# Patient Record
Sex: MALE | Race: OTHER | NOT HISPANIC OR LATINO | URBAN - METROPOLITAN AREA
[De-identification: names, ages, dates, MRNs, and addresses within clinical notes are randomized per-mention and may not be internally consistent; named-entity substitution may affect disease eponyms.]

---

## 2022-08-04 ENCOUNTER — INPATIENT (INPATIENT)
Facility: HOSPITAL | Age: 42
LOS: 10 days | Discharge: HOME HEALTH | DRG: 329 | End: 2022-08-15
Attending: SURGERY | Admitting: SURGERY
Payer: MEDICAID

## 2022-08-04 VITALS
OXYGEN SATURATION: 98 % | TEMPERATURE: 98 F | RESPIRATION RATE: 16 BRPM | SYSTOLIC BLOOD PRESSURE: 102 MMHG | WEIGHT: 195.11 LBS | HEART RATE: 93 BPM | DIASTOLIC BLOOD PRESSURE: 65 MMHG

## 2022-08-04 DIAGNOSIS — Z98.890 OTHER SPECIFIED POSTPROCEDURAL STATES: Chronic | ICD-10-CM

## 2022-08-04 LAB
ANION GAP SERPL CALC-SCNC: 11 MMOL/L — SIGNIFICANT CHANGE UP (ref 5–17)
APPEARANCE UR: CLEAR — SIGNIFICANT CHANGE UP
APTT BLD: 28.6 SEC — SIGNIFICANT CHANGE UP (ref 27.5–35.5)
BILIRUB UR-MCNC: NEGATIVE — SIGNIFICANT CHANGE UP
BLD GP AB SCN SERPL QL: NEGATIVE — SIGNIFICANT CHANGE UP
BLD GP AB SCN SERPL QL: NEGATIVE — SIGNIFICANT CHANGE UP
BUN SERPL-MCNC: 11 MG/DL — SIGNIFICANT CHANGE UP (ref 7–23)
CALCIUM SERPL-MCNC: 9.1 MG/DL — SIGNIFICANT CHANGE UP (ref 8.4–10.5)
CHLORIDE SERPL-SCNC: 103 MMOL/L — SIGNIFICANT CHANGE UP (ref 96–108)
CO2 SERPL-SCNC: 24 MMOL/L — SIGNIFICANT CHANGE UP (ref 22–31)
COLOR SPEC: YELLOW — SIGNIFICANT CHANGE UP
CREAT SERPL-MCNC: 0.73 MG/DL — SIGNIFICANT CHANGE UP (ref 0.5–1.3)
DIFF PNL FLD: NEGATIVE — SIGNIFICANT CHANGE UP
EGFR: 116 ML/MIN/1.73M2 — SIGNIFICANT CHANGE UP
GLUCOSE SERPL-MCNC: 125 MG/DL — HIGH (ref 70–99)
GLUCOSE UR QL: NEGATIVE — SIGNIFICANT CHANGE UP
HCT VFR BLD CALC: 41.9 % — SIGNIFICANT CHANGE UP (ref 39–50)
HGB BLD-MCNC: 14.7 G/DL — SIGNIFICANT CHANGE UP (ref 13–17)
INR BLD: 1.06 — SIGNIFICANT CHANGE UP (ref 0.88–1.16)
KETONES UR-MCNC: NEGATIVE — SIGNIFICANT CHANGE UP
LEUKOCYTE ESTERASE UR-ACNC: NEGATIVE — SIGNIFICANT CHANGE UP
MCHC RBC-ENTMCNC: 31.7 PG — SIGNIFICANT CHANGE UP (ref 27–34)
MCHC RBC-ENTMCNC: 35.1 GM/DL — SIGNIFICANT CHANGE UP (ref 32–36)
MCV RBC AUTO: 90.3 FL — SIGNIFICANT CHANGE UP (ref 80–100)
NITRITE UR-MCNC: NEGATIVE — SIGNIFICANT CHANGE UP
NRBC # BLD: 0 /100 WBCS — SIGNIFICANT CHANGE UP (ref 0–0)
PH UR: 6 — SIGNIFICANT CHANGE UP (ref 5–8)
PLATELET # BLD AUTO: 257 K/UL — SIGNIFICANT CHANGE UP (ref 150–400)
POTASSIUM SERPL-MCNC: 3.9 MMOL/L — SIGNIFICANT CHANGE UP (ref 3.5–5.3)
POTASSIUM SERPL-SCNC: 3.9 MMOL/L — SIGNIFICANT CHANGE UP (ref 3.5–5.3)
PROT UR-MCNC: NEGATIVE MG/DL — SIGNIFICANT CHANGE UP
PROTHROM AB SERPL-ACNC: 12.6 SEC — SIGNIFICANT CHANGE UP (ref 10.5–13.4)
RBC # BLD: 4.64 M/UL — SIGNIFICANT CHANGE UP (ref 4.2–5.8)
RBC # FLD: 12.1 % — SIGNIFICANT CHANGE UP (ref 10.3–14.5)
RH IG SCN BLD-IMP: POSITIVE — SIGNIFICANT CHANGE UP
RH IG SCN BLD-IMP: POSITIVE — SIGNIFICANT CHANGE UP
SARS-COV-2 RNA SPEC QL NAA+PROBE: NEGATIVE — SIGNIFICANT CHANGE UP
SODIUM SERPL-SCNC: 138 MMOL/L — SIGNIFICANT CHANGE UP (ref 135–145)
SP GR SPEC: 1.01 — SIGNIFICANT CHANGE UP (ref 1–1.03)
UROBILINOGEN FLD QL: 0.2 E.U./DL — SIGNIFICANT CHANGE UP
WBC # BLD: 10.28 K/UL — SIGNIFICANT CHANGE UP (ref 3.8–10.5)
WBC # FLD AUTO: 10.28 K/UL — SIGNIFICANT CHANGE UP (ref 3.8–10.5)

## 2022-08-04 PROCEDURE — 88307 TISSUE EXAM BY PATHOLOGIST: CPT | Mod: 26

## 2022-08-04 PROCEDURE — 74177 CT ABD & PELVIS W/CONTRAST: CPT | Mod: 26,MA

## 2022-08-04 PROCEDURE — 99285 EMERGENCY DEPT VISIT HI MDM: CPT

## 2022-08-04 DEVICE — STAPLER ETHICON PROXIMATE 75MM WITH BLUE RELOAD: Type: IMPLANTABLE DEVICE | Status: FUNCTIONAL

## 2022-08-04 DEVICE — STAPLER ETHICON PROXIMATE RELOAD 75MM BLUE: Type: IMPLANTABLE DEVICE | Status: FUNCTIONAL

## 2022-08-04 RX ORDER — PIPERACILLIN AND TAZOBACTAM 4; .5 G/20ML; G/20ML
3.38 INJECTION, POWDER, LYOPHILIZED, FOR SOLUTION INTRAVENOUS ONCE
Refills: 0 | Status: COMPLETED | OUTPATIENT
Start: 2022-08-04 | End: 2022-08-04

## 2022-08-04 RX ORDER — ACETAMINOPHEN 500 MG
650 TABLET ORAL EVERY 6 HOURS
Refills: 0 | Status: DISCONTINUED | OUTPATIENT
Start: 2022-08-04 | End: 2022-08-05

## 2022-08-04 RX ORDER — SODIUM CHLORIDE 9 MG/ML
1000 INJECTION INTRAMUSCULAR; INTRAVENOUS; SUBCUTANEOUS ONCE
Refills: 0 | Status: COMPLETED | OUTPATIENT
Start: 2022-08-04 | End: 2022-08-04

## 2022-08-04 RX ORDER — METRONIDAZOLE 500 MG
500 TABLET ORAL EVERY 8 HOURS
Refills: 0 | Status: DISCONTINUED | OUTPATIENT
Start: 2022-08-04 | End: 2022-08-04

## 2022-08-04 RX ORDER — METRONIDAZOLE 500 MG
500 TABLET ORAL ONCE
Refills: 0 | Status: DISCONTINUED | OUTPATIENT
Start: 2022-08-04 | End: 2022-08-04

## 2022-08-04 RX ORDER — PIPERACILLIN AND TAZOBACTAM 4; .5 G/20ML; G/20ML
3.38 INJECTION, POWDER, LYOPHILIZED, FOR SOLUTION INTRAVENOUS EVERY 6 HOURS
Refills: 0 | Status: COMPLETED | OUTPATIENT
Start: 2022-08-05 | End: 2022-08-11

## 2022-08-04 RX ORDER — SODIUM CHLORIDE 9 MG/ML
1000 INJECTION, SOLUTION INTRAVENOUS
Refills: 0 | Status: DISCONTINUED | OUTPATIENT
Start: 2022-08-04 | End: 2022-08-08

## 2022-08-04 RX ORDER — HYDROMORPHONE HYDROCHLORIDE 2 MG/ML
1 INJECTION INTRAMUSCULAR; INTRAVENOUS; SUBCUTANEOUS ONCE
Refills: 0 | Status: DISCONTINUED | OUTPATIENT
Start: 2022-08-04 | End: 2022-08-05

## 2022-08-04 RX ORDER — ONDANSETRON 8 MG/1
4 TABLET, FILM COATED ORAL ONCE
Refills: 0 | Status: COMPLETED | OUTPATIENT
Start: 2022-08-04 | End: 2022-08-04

## 2022-08-04 RX ORDER — SODIUM CHLORIDE 9 MG/ML
500 INJECTION, SOLUTION INTRAVENOUS ONCE
Refills: 0 | Status: COMPLETED | OUTPATIENT
Start: 2022-08-04 | End: 2022-08-04

## 2022-08-04 RX ORDER — KETOROLAC TROMETHAMINE 30 MG/ML
15 SYRINGE (ML) INJECTION ONCE
Refills: 0 | Status: DISCONTINUED | OUTPATIENT
Start: 2022-08-04 | End: 2022-08-04

## 2022-08-04 RX ORDER — CEFTRIAXONE 500 MG/1
2000 INJECTION, POWDER, FOR SOLUTION INTRAMUSCULAR; INTRAVENOUS EVERY 24 HOURS
Refills: 0 | Status: DISCONTINUED | OUTPATIENT
Start: 2022-08-04 | End: 2022-08-04

## 2022-08-04 RX ORDER — HYDROMORPHONE HYDROCHLORIDE 2 MG/ML
0.5 INJECTION INTRAMUSCULAR; INTRAVENOUS; SUBCUTANEOUS EVERY 4 HOURS
Refills: 0 | Status: DISCONTINUED | OUTPATIENT
Start: 2022-08-04 | End: 2022-08-07

## 2022-08-04 RX ORDER — HEPARIN SODIUM 5000 [USP'U]/ML
5000 INJECTION INTRAVENOUS; SUBCUTANEOUS EVERY 8 HOURS
Refills: 0 | Status: DISCONTINUED | OUTPATIENT
Start: 2022-08-04 | End: 2022-08-05

## 2022-08-04 RX ORDER — CEFAZOLIN SODIUM 1 G
2000 VIAL (EA) INJECTION EVERY 8 HOURS
Refills: 0 | Status: DISCONTINUED | OUTPATIENT
Start: 2022-08-04 | End: 2022-08-04

## 2022-08-04 RX ORDER — CEFTRIAXONE 500 MG/1
1000 INJECTION, POWDER, FOR SOLUTION INTRAMUSCULAR; INTRAVENOUS ONCE
Refills: 0 | Status: DISCONTINUED | OUTPATIENT
Start: 2022-08-04 | End: 2022-08-04

## 2022-08-04 RX ORDER — ACETAMINOPHEN 500 MG
1000 TABLET ORAL ONCE
Refills: 0 | Status: COMPLETED | OUTPATIENT
Start: 2022-08-04 | End: 2022-08-04

## 2022-08-04 RX ORDER — DIATRIZOATE MEGLUMINE 180 MG/ML
30 INJECTION, SOLUTION INTRAVESICAL ONCE
Refills: 0 | Status: COMPLETED | OUTPATIENT
Start: 2022-08-04 | End: 2022-08-04

## 2022-08-04 RX ORDER — HYDROMORPHONE HYDROCHLORIDE 2 MG/ML
1 INJECTION INTRAMUSCULAR; INTRAVENOUS; SUBCUTANEOUS ONCE
Refills: 0 | Status: DISCONTINUED | OUTPATIENT
Start: 2022-08-04 | End: 2022-08-04

## 2022-08-04 RX ADMIN — HEPARIN SODIUM 5000 UNIT(S): 5000 INJECTION INTRAVENOUS; SUBCUTANEOUS at 21:40

## 2022-08-04 RX ADMIN — SODIUM CHLORIDE 1000 MILLILITER(S): 9 INJECTION, SOLUTION INTRAVENOUS at 19:43

## 2022-08-04 RX ADMIN — Medication 15 MILLIGRAM(S): at 15:16

## 2022-08-04 RX ADMIN — Medication 100 MILLIGRAM(S): at 18:37

## 2022-08-04 RX ADMIN — HYDROMORPHONE HYDROCHLORIDE 0.5 MILLIGRAM(S): 2 INJECTION INTRAMUSCULAR; INTRAVENOUS; SUBCUTANEOUS at 21:40

## 2022-08-04 RX ADMIN — Medication 15 MILLIGRAM(S): at 08:53

## 2022-08-04 RX ADMIN — ONDANSETRON 4 MILLIGRAM(S): 8 TABLET, FILM COATED ORAL at 19:13

## 2022-08-04 RX ADMIN — SODIUM CHLORIDE 1000 MILLILITER(S): 9 INJECTION INTRAMUSCULAR; INTRAVENOUS; SUBCUTANEOUS at 07:59

## 2022-08-04 RX ADMIN — HYDROMORPHONE HYDROCHLORIDE 1 MILLIGRAM(S): 2 INJECTION INTRAMUSCULAR; INTRAVENOUS; SUBCUTANEOUS at 19:06

## 2022-08-04 RX ADMIN — Medication 15 MILLIGRAM(S): at 15:42

## 2022-08-04 RX ADMIN — HEPARIN SODIUM 5000 UNIT(S): 5000 INJECTION INTRAVENOUS; SUBCUTANEOUS at 11:40

## 2022-08-04 RX ADMIN — CEFTRIAXONE 100 MILLIGRAM(S): 500 INJECTION, POWDER, FOR SOLUTION INTRAMUSCULAR; INTRAVENOUS at 12:30

## 2022-08-04 RX ADMIN — Medication 15 MILLIGRAM(S): at 08:26

## 2022-08-04 RX ADMIN — SODIUM CHLORIDE 130 MILLILITER(S): 9 INJECTION, SOLUTION INTRAVENOUS at 22:12

## 2022-08-04 RX ADMIN — Medication 100 MILLIGRAM(S): at 11:40

## 2022-08-04 RX ADMIN — HYDROMORPHONE HYDROCHLORIDE 0.5 MILLIGRAM(S): 2 INJECTION INTRAMUSCULAR; INTRAVENOUS; SUBCUTANEOUS at 22:15

## 2022-08-04 RX ADMIN — SODIUM CHLORIDE 1000 MILLILITER(S): 9 INJECTION INTRAMUSCULAR; INTRAVENOUS; SUBCUTANEOUS at 08:54

## 2022-08-04 RX ADMIN — Medication 100 MILLIGRAM(S): at 12:16

## 2022-08-04 RX ADMIN — DIATRIZOATE MEGLUMINE 30 MILLILITER(S): 180 INJECTION, SOLUTION INTRAVESICAL at 08:27

## 2022-08-04 RX ADMIN — PIPERACILLIN AND TAZOBACTAM 200 GRAM(S): 4; .5 INJECTION, POWDER, LYOPHILIZED, FOR SOLUTION INTRAVENOUS at 19:48

## 2022-08-04 RX ADMIN — HYDROMORPHONE HYDROCHLORIDE 1 MILLIGRAM(S): 2 INJECTION INTRAMUSCULAR; INTRAVENOUS; SUBCUTANEOUS at 18:59

## 2022-08-04 NOTE — H&P ADULT - NSHPPHYSICALEXAM_GEN_ALL_CORE
Physical Exam:  General: NAD, resting comfortably in bed  Pulmonary: Nonlabored, no respiratory distress  Cardiovascular: regular rate and rhythm   Abdominal: soft, significant tenderness and focal peritonitis in LLQ, rebound tenderness in RLQ, otherwise soft abdomen  Extremities: WWP, normal strength, Right knee in immobilizer consistent with history  Neuro: A/O x 3, no focal deficits, normal motor/sensation  Pulses: palpable distal pulses

## 2022-08-04 NOTE — H&P ADULT - NSHPLABSRESULTS_GEN_ALL_CORE
14.7   10.28 )-----------( 257      ( 04 Aug 2022 07:49 )             41.9   08-04    138  |  103  |  11  ----------------------------<  125<H>  3.9   |  24  |  0.73    Ca    9.1      04 Aug 2022 07:49    < from: CT Abdomen and Pelvis w/ Oral Cont and w/ IV Cont (08.04.22 @ 09:55) >    ACC: [id number] EXAM:  CT ABDOMEN AND PELVIS OC IC                          PROCEDURE DATE:  08/04/2022          INTERPRETATION:  CLINICAL INFORMATION: Left lower quadrant pain and   tenderness to palpation. Diarrhea.    COMPARISON: None available.    CONTRAST/COMPLICATIONS:  IV Contrast: Isovue 370  90 cc administered  Oral Contrast: Positive oral contrast  Complications: None reported at time of study completion    PROCEDURE:  CT of the Abdomen and Pelvis was performed.  Sagittal and coronal reformats were performed.    FINDINGS:  LOWER CHEST: Minimal dependent atelectasis.    LIVER: Within normal limits.  BILE DUCTS: Normal caliber.  GALLBLADDER: Within normal limits.  SPLEEN: Mildly enlarged measuring up to 14 cm. Multiple hypodense   lesions, largest is heterogenous attenuation 5.0 x 4.7 cm (3:49)   additional lesions are smaller, mostly subcentimeter.  PANCREAS: Within normal limits.  ADRENALS: Within normal limits.  KIDNEYS/URETERS: Within normal limits.    BLADDER: Within normal limits.  REPRODUCTIVE ORGANS: Prostate within normal limits.    BOWEL: Small hiatal hernia. Acute diverticulitis distal left colon and   proximal sigmoid, wall thickening and extensive local regional   pericolonic fat infiltration confined to left lower quadrant. Small   extraluminal gas in left lower quadrant (3:121) and adjacent mild   peritoneal thickening and enhancement. No adjacent organized fluid   collection/abscess. No bowel obstruction. Appendix is normal.  PERITONEUM: Small ascites in christy hepatis and left paracolic gutter and   pelvis.  VESSELS: Within normal limits.  RETROPERITONEUM/LYMPH NODES: No lymphadenopathy.  ABDOMINAL WALL: Within normal limits.  BONES: Within normal limits.    IMPRESSION:  1.  Acute diverticulitis distal left colon/proximal sigmoid with   microperforation and focal peritonitis in left lower quadrant.  2.  Mildly enlarged spleen with 5 cm splenic mass and few smaller   hypodense splenic lesions, indeterminate, possibly benign such as   hemangioma, malignancy cannot be excluded. In the absence of prior   imaging to confirm long-term stability, recommend outpatient follow-up   with contrast-enhanced MRI abdomen for further characterization.        --- End of Report ---            RADHA MCNEILL MD; Attending Radiologist  This document has been electronically signed. Aug  4 2022 10:22AM    < end of copied text >

## 2022-08-04 NOTE — ED ADULT NURSE NOTE - NSIMPLEMENTINTERV_GEN_ALL_ED
Implemented All Universal Safety Interventions:  [place] to call system. Call bell, personal items and telephone within reach. Instruct patient to call for assistance. Room bathroom lighting operational. Non-slip footwear when patient is off stretcher. Physically safe environment: no spills, clutter or unnecessary equipment. Stretcher in lowest position, wheels locked, appropriate side rails in place.

## 2022-08-04 NOTE — ED PROVIDER NOTE - PROGRESS NOTE DETAILS
Labs wnl, ct + diverticulitis, microperf.  Abx ordered.  Surg eval pt after they were contacted independently by Dr Deluca and were updated w ct results.  Pt reports pain improved.  CT w abnl findings in spleen - discussed w pt and instructed him to fu w pmd about this for outpt non-emergent imaging; pt expressed understanding and agreement.  Plan for admit to surg but await surg recommendations/dispo. Per surg, tba Dr Deluca, tele.

## 2022-08-04 NOTE — ED ADULT NURSE NOTE - OBJECTIVE STATEMENT
pt a&ox4 c/o llq pain and diarrhea beginning ~0400 this morning. pt reports hx of diverticulosis, reports similar symptoms.

## 2022-08-04 NOTE — ED ADULT TRIAGE NOTE - GLASGOW COMA SCALE: BEST MOTOR RESPONSE, MLM
(M6) obeys commands Erythromycin Counseling:  I discussed with the patient the risks of erythromycin including but not limited to GI upset, allergic reaction, drug rash, diarrhea, increase in liver enzymes, and yeast infections.

## 2022-08-04 NOTE — ED PROVIDER NOTE - PHYSICAL EXAMINATION
VITAL SIGNS: I have reviewed nursing notes and confirm.  CONSTITUTIONAL: Well-developed; well-nourished; in no acute distress.   SKIN:  warm and dry, no acute rash.   HEAD:  normocephalic, atraumatic.  EYES: PERRL, EOM intact; conjunctiva and sclera clear.  ENT: No nasal discharge; airway clear.   NECK: Supple; non tender.  CARD: S1, S2 normal; no murmurs, gallops, or rubs. Regular rate and rhythm.   RESP:  Clear to auscultation b/l, no wheezes, rales or rhonchi.  ABD: Normal bowel sounds; soft; non-distended; ttp LLQ w referred pain to LLQ, + voluntary guarding  MSK: Normal ROM. No clubbing, cyanosis or edema. no ttp bilat le  NEURO: Alert, oriented, grossly unremarkable  PSYCH: Cooperative, mood and affect appropriate.

## 2022-08-04 NOTE — ED ADULT TRIAGE NOTE - CHIEF COMPLAINT QUOTE
Patient presents to ER with chief complaints of LLQ abdominal pain and diarrhea since 4am. Patient states feels like diverticulosis flare up.

## 2022-08-04 NOTE — H&P ADULT - ASSESSMENT
43yo male with PMH of diverticulitis presents with 1 day history of abdominal pain and diarrhea. Afebrile, non tachycardic, normotensive, exam significant for significant tenderness and focal peritonitis. Labs notable for normal WBC normal renal function. CT demonstrating acute diverticulitis with microperforation with focal peritonitis. Incidental finding of splenic lesion. Impression is acute complicated diverticulitis with focal perforation.       - NPO/IVF  - Abx: CTX 2g, MTD  - Pain and Nausea control  - SCD/SQH  - IS/OOB  - Preop workup  - Serial abdominal exams  - AM labs

## 2022-08-04 NOTE — H&P ADULT - HISTORY OF PRESENT ILLNESS
41yo male with PMH of diverticulitis who presents to emergency with 1 day history of abdominal pain. Patient has had 3 previous episodes of uncomplicated diverticulitis managed outpatient with antibiotics. Presents today with acute onset intense, sharp, constant 10/10 LLQ abdominal pain, non radiating. Pain started at 4am this AM and awoke him from sleep. Pain feels more intense than previous episodes of diverticulitis. He endorses 1 day prodromal symptoms of diarrhea without blood or mucus and malaise. Denies current headache, fever, chills, nausea, vomiting, anorexia, weight loss or skin changes. Last colonoscopy revealing diverticulosis No personal or family history of IBD/CRC    PMH: Diverticulitis (3 episodes of uncomplicated)   PSH: Right knee MCL/ACL/Meniscus repair (2022)  MED: Propecia  ALL: NKDA  SH: non smoker, daily ETOH use, negative CAGE questionnaire   FH: no personal or family history of CRC/IBD  Functional capacity: >4 METS    In the ED:  - afebrile, non tachycardic, normotensive  - Labs wnl  - CTAP demonstrated:  1.  Acute diverticulitis distal left colon/proximal sigmoid with   microperforation and focal peritonitis in left lower quadrant.  2.  Mildly enlarged spleen with 5 cm splenic mass and few smaller   hypodense splenic lesions, indeterminate, possibly benign such as   hemangioma, malignancy cannot be excluded. In the absence of prior   imaging to confirm long-term stability, recommend outpatient follow-up   with contrast-enhanced MRI abdomen for further characterization.   43yo male with PMH of diverticulitis who presents to emergency with 1 day history of abdominal pain. Patient has had 3 previous episodes of uncomplicated diverticulitis managed outpatient with antibiotics. Presents today with acute onset intense, sharp, constant 10/10 LLQ abdominal pain, non radiating. Pain started at 4am this AM and awoke him from sleep. Pain feels more intense than previous episodes of diverticulitis. He endorses 1 day prodromal symptoms of diarrhea without blood or mucus and malaise. Denies current headache, fever, chills, nausea, vomiting, anorexia, weight loss or skin changes. No colonoscopy hx. No personal or family history of IBD/CRC    PMH: Diverticulitis (3 episodes of uncomplicated)   PSH: Right knee MCL/ACL/Meniscus repair (2022)  MED: Propecia  ALL: NKDA  SH: non smoker, daily ETOH use, negative CAGE questionnaire   FH: no personal or family history of CRC/IBD  Functional capacity: >4 METS    In the ED:  - afebrile, non tachycardic, normotensive  - Labs wnl  - CTAP demonstrated:  1.  Acute diverticulitis distal left colon/proximal sigmoid with   microperforation and focal peritonitis in left lower quadrant.  2.  Mildly enlarged spleen with 5 cm splenic mass and few smaller   hypodense splenic lesions, indeterminate, possibly benign such as   hemangioma, malignancy cannot be excluded. In the absence of prior   imaging to confirm long-term stability, recommend outpatient follow-up   with contrast-enhanced MRI abdomen for further characterization.

## 2022-08-04 NOTE — ED PROVIDER NOTE - CLINICAL SUMMARY MEDICAL DECISION MAKING FREE TEXT BOX
Pt c/o llq pain, diarrhea similar to prior diverticulitis w ttp LLQ w referred pain to LLQ v concerning for diverticulitis.  Less concern for stone.  Plan labs, ct abd/pelvis, pain meds prn.  Reassess.

## 2022-08-04 NOTE — ED PROVIDER NOTE - OBJECTIVE STATEMENT
41 yo male h/o diverticulitis c/o waking this am w severe 8/10 initially cramping now constant LLQ pain similar to prior diverticulitis associated w nonbloody diarrhea x 4.  No fever, n/v.  No dysuria, hematuria.  No meds pta for sx.  No radiation, no flank pain.  Pt prev saw a surgeon but delayed surgery for diverticulitis.

## 2022-08-05 LAB
ANION GAP SERPL CALC-SCNC: 10 MMOL/L — SIGNIFICANT CHANGE UP (ref 5–17)
ANISOCYTOSIS BLD QL: SLIGHT — SIGNIFICANT CHANGE UP
BASOPHILS # BLD AUTO: 0 K/UL — SIGNIFICANT CHANGE UP (ref 0–0.2)
BASOPHILS NFR BLD AUTO: 0 % — SIGNIFICANT CHANGE UP (ref 0–2)
BUN SERPL-MCNC: 8 MG/DL — SIGNIFICANT CHANGE UP (ref 7–23)
CALCIUM SERPL-MCNC: 8.9 MG/DL — SIGNIFICANT CHANGE UP (ref 8.4–10.5)
CHLORIDE SERPL-SCNC: 100 MMOL/L — SIGNIFICANT CHANGE UP (ref 96–108)
CO2 SERPL-SCNC: 27 MMOL/L — SIGNIFICANT CHANGE UP (ref 22–31)
CREAT SERPL-MCNC: 0.85 MG/DL — SIGNIFICANT CHANGE UP (ref 0.5–1.3)
EGFR: 111 ML/MIN/1.73M2 — SIGNIFICANT CHANGE UP
EOSINOPHIL # BLD AUTO: 0 K/UL — SIGNIFICANT CHANGE UP (ref 0–0.5)
EOSINOPHIL NFR BLD AUTO: 0 % — SIGNIFICANT CHANGE UP (ref 0–6)
GIANT PLATELETS BLD QL SMEAR: PRESENT — SIGNIFICANT CHANGE UP
GLUCOSE SERPL-MCNC: 137 MG/DL — HIGH (ref 70–99)
GRAM STN FLD: SIGNIFICANT CHANGE UP
HCT VFR BLD CALC: 38.8 % — LOW (ref 39–50)
HGB BLD-MCNC: 13.5 G/DL — SIGNIFICANT CHANGE UP (ref 13–17)
HYPOCHROMIA BLD QL: SLIGHT — SIGNIFICANT CHANGE UP
LYMPHOCYTES # BLD AUTO: 0.33 K/UL — LOW (ref 1–3.3)
LYMPHOCYTES # BLD AUTO: 3.5 % — LOW (ref 13–44)
MAGNESIUM SERPL-MCNC: 1.7 MG/DL — SIGNIFICANT CHANGE UP (ref 1.6–2.6)
MANUAL SMEAR VERIFICATION: SIGNIFICANT CHANGE UP
MCHC RBC-ENTMCNC: 31.5 PG — SIGNIFICANT CHANGE UP (ref 27–34)
MCHC RBC-ENTMCNC: 34.8 GM/DL — SIGNIFICANT CHANGE UP (ref 32–36)
MCV RBC AUTO: 90.4 FL — SIGNIFICANT CHANGE UP (ref 80–100)
METAMYELOCYTES # FLD: 0.9 % — HIGH (ref 0–0)
MICROCYTES BLD QL: SLIGHT — SIGNIFICANT CHANGE UP
MONOCYTES # BLD AUTO: 0.16 K/UL — SIGNIFICANT CHANGE UP (ref 0–0.9)
MONOCYTES NFR BLD AUTO: 1.7 % — LOW (ref 2–14)
NEUTROPHILS # BLD AUTO: 8.82 K/UL — HIGH (ref 1.8–7.4)
NEUTROPHILS NFR BLD AUTO: 93.9 % — HIGH (ref 43–77)
OVALOCYTES BLD QL SMEAR: SLIGHT — SIGNIFICANT CHANGE UP
PHOSPHATE SERPL-MCNC: 3.1 MG/DL — SIGNIFICANT CHANGE UP (ref 2.5–4.5)
PLAT MORPH BLD: ABNORMAL
PLATELET # BLD AUTO: 228 K/UL — SIGNIFICANT CHANGE UP (ref 150–400)
POIKILOCYTOSIS BLD QL AUTO: SLIGHT — SIGNIFICANT CHANGE UP
POTASSIUM SERPL-MCNC: 4 MMOL/L — SIGNIFICANT CHANGE UP (ref 3.5–5.3)
POTASSIUM SERPL-SCNC: 4 MMOL/L — SIGNIFICANT CHANGE UP (ref 3.5–5.3)
RBC # BLD: 4.29 M/UL — SIGNIFICANT CHANGE UP (ref 4.2–5.8)
RBC # FLD: 12.3 % — SIGNIFICANT CHANGE UP (ref 10.3–14.5)
RBC BLD AUTO: ABNORMAL
SODIUM SERPL-SCNC: 137 MMOL/L — SIGNIFICANT CHANGE UP (ref 135–145)
SPECIMEN SOURCE: SIGNIFICANT CHANGE UP
SPHEROCYTES BLD QL SMEAR: SLIGHT — SIGNIFICANT CHANGE UP
WBC # BLD: 9.39 K/UL — SIGNIFICANT CHANGE UP (ref 3.8–10.5)
WBC # FLD AUTO: 9.39 K/UL — SIGNIFICANT CHANGE UP (ref 3.8–10.5)

## 2022-08-05 PROCEDURE — 71045 X-RAY EXAM CHEST 1 VIEW: CPT | Mod: 26,76

## 2022-08-05 RX ORDER — ACETAMINOPHEN 500 MG
1000 TABLET ORAL EVERY 6 HOURS
Refills: 0 | Status: DISCONTINUED | OUTPATIENT
Start: 2022-08-05 | End: 2022-08-05

## 2022-08-05 RX ORDER — HYDROMORPHONE HYDROCHLORIDE 2 MG/ML
1 INJECTION INTRAMUSCULAR; INTRAVENOUS; SUBCUTANEOUS EVERY 4 HOURS
Refills: 0 | Status: DISCONTINUED | OUTPATIENT
Start: 2022-08-05 | End: 2022-08-07

## 2022-08-05 RX ORDER — HEPARIN SODIUM 5000 [USP'U]/ML
5000 INJECTION INTRAVENOUS; SUBCUTANEOUS EVERY 8 HOURS
Refills: 0 | Status: DISCONTINUED | OUTPATIENT
Start: 2022-08-05 | End: 2022-08-15

## 2022-08-05 RX ORDER — ONDANSETRON 8 MG/1
4 TABLET, FILM COATED ORAL EVERY 6 HOURS
Refills: 0 | Status: DISCONTINUED | OUTPATIENT
Start: 2022-08-05 | End: 2022-08-08

## 2022-08-05 RX ORDER — ACETAMINOPHEN 500 MG
1000 TABLET ORAL ONCE
Refills: 0 | Status: COMPLETED | OUTPATIENT
Start: 2022-08-05 | End: 2022-08-05

## 2022-08-05 RX ORDER — FINASTERIDE 5 MG/1
1 TABLET, FILM COATED ORAL DAILY
Refills: 0 | Status: DISCONTINUED | OUTPATIENT
Start: 2022-08-06 | End: 2022-08-06

## 2022-08-05 RX ORDER — LANOLIN ALCOHOL/MO/W.PET/CERES
5 CREAM (GRAM) TOPICAL AT BEDTIME
Refills: 0 | Status: DISCONTINUED | OUTPATIENT
Start: 2022-08-05 | End: 2022-08-15

## 2022-08-05 RX ORDER — ACETAMINOPHEN 500 MG
650 TABLET ORAL EVERY 6 HOURS
Refills: 0 | Status: DISCONTINUED | OUTPATIENT
Start: 2022-08-05 | End: 2022-08-15

## 2022-08-05 RX ORDER — MAGNESIUM SULFATE 500 MG/ML
1 VIAL (ML) INJECTION ONCE
Refills: 0 | Status: COMPLETED | OUTPATIENT
Start: 2022-08-05 | End: 2022-08-05

## 2022-08-05 RX ADMIN — HYDROMORPHONE HYDROCHLORIDE 0.5 MILLIGRAM(S): 2 INJECTION INTRAMUSCULAR; INTRAVENOUS; SUBCUTANEOUS at 03:59

## 2022-08-05 RX ADMIN — Medication 5 MILLIGRAM(S): at 22:17

## 2022-08-05 RX ADMIN — HYDROMORPHONE HYDROCHLORIDE 0.5 MILLIGRAM(S): 2 INJECTION INTRAMUSCULAR; INTRAVENOUS; SUBCUTANEOUS at 15:22

## 2022-08-05 RX ADMIN — PIPERACILLIN AND TAZOBACTAM 200 GRAM(S): 4; .5 INJECTION, POWDER, LYOPHILIZED, FOR SOLUTION INTRAVENOUS at 19:37

## 2022-08-05 RX ADMIN — PIPERACILLIN AND TAZOBACTAM 200 GRAM(S): 4; .5 INJECTION, POWDER, LYOPHILIZED, FOR SOLUTION INTRAVENOUS at 15:03

## 2022-08-05 RX ADMIN — PIPERACILLIN AND TAZOBACTAM 200 GRAM(S): 4; .5 INJECTION, POWDER, LYOPHILIZED, FOR SOLUTION INTRAVENOUS at 07:01

## 2022-08-05 RX ADMIN — Medication 1000 MILLIGRAM(S): at 19:42

## 2022-08-05 RX ADMIN — HEPARIN SODIUM 5000 UNIT(S): 5000 INJECTION INTRAVENOUS; SUBCUTANEOUS at 22:06

## 2022-08-05 RX ADMIN — Medication 100 GRAM(S): at 17:58

## 2022-08-05 RX ADMIN — PIPERACILLIN AND TAZOBACTAM 200 GRAM(S): 4; .5 INJECTION, POWDER, LYOPHILIZED, FOR SOLUTION INTRAVENOUS at 02:10

## 2022-08-05 RX ADMIN — Medication 400 MILLIGRAM(S): at 18:34

## 2022-08-05 RX ADMIN — HEPARIN SODIUM 5000 UNIT(S): 5000 INJECTION INTRAVENOUS; SUBCUTANEOUS at 15:03

## 2022-08-05 RX ADMIN — HYDROMORPHONE HYDROCHLORIDE 0.5 MILLIGRAM(S): 2 INJECTION INTRAMUSCULAR; INTRAVENOUS; SUBCUTANEOUS at 03:47

## 2022-08-05 RX ADMIN — HYDROMORPHONE HYDROCHLORIDE 0.5 MILLIGRAM(S): 2 INJECTION INTRAMUSCULAR; INTRAVENOUS; SUBCUTANEOUS at 15:02

## 2022-08-05 NOTE — PROGRESS NOTE ADULT - ASSESSMENT
42 M PMH of uncomplicated diverticulitis x3 (last episode 6 months ago) presents with acute complicated diverticulitis with focal perforation, on IV antibiotics now s/p Hartmans procedure on 8/4.      - sips/IVF   - NGT to LIWS  - Abx: zosyn; s/p CTX 2g, MTD  - Pain and Nausea control  - SCD/SQH in AM after cbc   - ostomy consult  - IS/OOB  - AM labs

## 2022-08-05 NOTE — CONSULT NOTE ADULT - SUBJECTIVE AND OBJECTIVE BOX
HPI:  43yo male with PMH of diverticulitis who presents to emergency with 1 day history of abdominal pain. Patient has had 3 previous episodes of uncomplicated diverticulitis managed outpatient with antibiotics. Presents today with acute onset intense, sharp, constant 10/10 LLQ abdominal pain, non radiating. Pain started at 4am this AM and awoke him from sleep. Pain feels more intense than previous episodes of diverticulitis. He endorses 1 day prodromal symptoms of diarrhea without blood or mucus and malaise. Denies current headache, fever, chills, nausea, vomiting, anorexia, weight loss or skin changes. No colonoscopy hx. No personal or family history of IBD/CRC    PMH: Diverticulitis (3 episodes of uncomplicated)   PSH: Right knee MCL/ACL/Meniscus repair ()  MED: Propecia  ALL: NKDA  SH: non smoker, daily ETOH use, negative CAGE questionnaire   FH: no personal or family history of CRC/IBD  Functional capacity: >4 METS    In the ED:  - afebrile, non tachycardic, normotensive  - Labs wnl  - CTAP demonstrated:  1.  Acute diverticulitis distal left colon/proximal sigmoid with   microperforation and focal peritonitis in left lower quadrant.  2.  Mildly enlarged spleen with 5 cm splenic mass and few smaller   hypodense splenic lesions, indeterminate, possibly benign such as   hemangioma, malignancy cannot be excluded. In the absence of prior   imaging to confirm long-term stability, recommend outpatient follow-up   with contrast-enhanced MRI abdomen for further characterization.   (04 Aug 2022 11:08)      PAST MEDICAL & SURGICAL HISTORY:  Diverticulitis      H/O knee surgery          REVIEW OF SYSTEMS      General:	    Skin/Breast:  	  Ophthalmologic:  	  ENMT:	    Respiratory and Thorax:  	  Cardiovascular:	    Gastrointestinal:	    Genitourinary:	    Musculoskeletal:	    Neurological:	    Psychiatric:	    Hematology/Lymphatics:	    Endocrine:	    Allergic/Immunologic:	    MEDICATIONS  (STANDING):  acetaminophen   IVPB .. 1000 milliGRAM(s) IV Intermittent once  lactated ringers. 1000 milliLiter(s) (130 mL/Hr) IV Continuous <Continuous>  piperacillin/tazobactam IVPB.. 3.375 Gram(s) IV Intermittent every 6 hours    MEDICATIONS  (PRN):  HYDROmorphone  Injectable 0.5 milliGRAM(s) IV Push every 4 hours PRN Severe Pain (7 - 10)  HYDROmorphone  Injectable 1 milliGRAM(s) IV Push once PRN Breakthrough pain  ondansetron Injectable 4 milliGRAM(s) IV Push every 6 hours PRN Nausea and/or Vomiting      Allergies    No Known Allergies    Intolerances        SOCIAL HISTORY:    FAMILY HISTORY:  No pertinent family history in first degree relatives        Vital Signs Last 24 Hrs  T(C): 36.9 (05 Aug 2022 04:42), Max: 37.8 (04 Aug 2022 20:20)  T(F): 98.4 (05 Aug 2022 04:42), Max: 100.1 (04 Aug 2022 20:20)  HR: 109 (05 Aug 2022 04:18) (84 - 121)  BP: 133/78 (05 Aug 2022 04:18) (119/66 - 164/85)  BP(mean): 118 (05 Aug 2022 03:59) (85 - 118)  RR: 16 (05 Aug 2022 04:18) (9 - 18)  SpO2: 93% (05 Aug 2022 04:18) (93% - 99%)    Parameters below as of 05 Aug 2022 04:18  Patient On (Oxygen Delivery Method): room air        PHYSICAL EXAM:      Constitutional:    Eyes:    ENMT:    Neck:    Breasts:    Back:    Respiratory:    Cardiovascular:    Gastrointestinal:    Genitourinary:    Rectal:    Extremities:    Vascular:    Neurological:    Skin:    Lymph Nodes:    Musculoskeletal:    Psychiatric:        LABS:                        14.7   10.28 )-----------( 257      ( 04 Aug 2022 07:49 )             41.9     08-04    138  |  103  |  11  ----------------------------<  125<H>  3.9   |  24  |  0.73    Ca    9.1      04 Aug 2022 07:49      PT/INR - ( 04 Aug 2022 11:28 )   PT: 12.6 sec;   INR: 1.06          PTT - ( 04 Aug 2022 11:28 )  PTT:28.6 sec  Urinalysis Basic - ( 04 Aug 2022 09:22 )    Color: Yellow / Appearance: Clear / S.015 / pH: x  Gluc: x / Ketone: NEGATIVE  / Bili: Negative / Urobili: 0.2 E.U./dL   Blood: x / Protein: NEGATIVE mg/dL / Nitrite: NEGATIVE   Leuk Esterase: NEGATIVE / RBC: x / WBC x   Sq Epi: x / Non Sq Epi: x / Bacteria: x        RADIOLOGY & ADDITIONAL STUDIES: HPI:  41yo male with PMH of diverticulitis who presents to emergency with 1 day history of abdominal pain. Patient has had 3 previous episodes of uncomplicated diverticulitis managed outpatient with antibiotics. Presents today with acute onset intense, sharp, constant 10/10 LLQ abdominal pain, non radiating. Pain started at 4am this AM and awoke him from sleep. Pain feels more intense than previous episodes of diverticulitis. He endorses 1 day prodromal symptoms of diarrhea without blood or mucus and malaise. Denies current headache, fever, chills, nausea, vomiting, anorexia, weight loss or skin changes. No colonoscopy hx. No personal or family history of IBD/CRC    Patient underwent left colon resection with colostomy earlier this morning.  Currently on Pip-Tazo  Uncomfortable with some abdominal pain  No N/V    ROS otherwise negative      PAST MEDICAL & SURGICAL HISTORY:  Diverticulitis      H/O knee surgery        MEDICATIONS  (STANDING):  acetaminophen   IVPB .. 1000 milliGRAM(s) IV Intermittent once  lactated ringers. 1000 milliLiter(s) (130 mL/Hr) IV Continuous <Continuous>  piperacillin/tazobactam IVPB.. 3.375 Gram(s) IV Intermittent every 6 hours    MEDICATIONS  (PRN):  HYDROmorphone  Injectable 0.5 milliGRAM(s) IV Push every 4 hours PRN Severe Pain (7 - 10)  HYDROmorphone  Injectable 1 milliGRAM(s) IV Push once PRN Breakthrough pain  ondansetron Injectable 4 milliGRAM(s) IV Push every 6 hours PRN Nausea and/or Vomiting      Allergies    No Known Allergies      SOCIAL HISTORY:  Works - owns pharmacy  No illicit drug use  No smoking  Uses EtOH daily  Originally from [place]       FAMILY HISTORY:  No pertinent family history in first degree relatives      EXAM  Vital Signs Last 24 Hrs  T(C): 36.9 (05 Aug 2022 04:42), Max: 37.8 (04 Aug 2022 20:20)  T(F): 98.4 (05 Aug 2022 04:42), Max: 100.1 (04 Aug 2022 20:20)  HR: 109 (05 Aug 2022 04:18) (84 - 121)  BP: 133/78 (05 Aug 2022 04:18) (119/66 - 164/85)  BP(mean): 118 (05 Aug 2022 03:59) (85 - 118)  RR: 16 (05 Aug 2022 04:18) (9 - 18)  SpO2: 93% (05 Aug 2022 04:18) (93% - 99%)    Parameters below as of 05 Aug 2022 04:18  Patient On (Oxygen Delivery Method): room air  Awake and alert  No distress  No jaundice or icterus  No rash  Oral mucosa clear  No cervical adenopathy  Low grade regular tachy  Chest CTA  Abd softly distended and tender appropriately after OR. Colostomy  LEs no edema      LABS:                        14.7   10.28 )-----------( 257      ( 04 Aug 2022 07:49 )             41.9         138  |  103  |  11  ----------------------------<  125<H>  3.9   |  24  |  0.73    Ca    9.1      04 Aug 2022 07:49      PT/INR - ( 04 Aug 2022 11:28 )   PT: 12.6 sec;   INR: 1.06          PTT - ( 04 Aug 2022 11:28 )  PTT:28.6 sec  Urinalysis Basic - ( 04 Aug 2022 09:22 )    Color: Yellow / Appearance: Clear / S.015 / pH: x  Gluc: x / Ketone: NEGATIVE  / Bili: Negative / Urobili: 0.2 E.U./dL   Blood: x / Protein: NEGATIVE mg/dL / Nitrite: NEGATIVE   Leuk Esterase: NEGATIVE / RBC: x / WBC x   Sq Epi: x / Non Sq Epi: x / Bacteria: x    Culture - Surgical Swab (22 @ 23:39)    Gram Stain:   Rare Gram positive cocci in pairs  Moderate to Numerous White blood cells    Specimen Source: .Surgical Swab Intra-abdominal puss    Culture - Blood (22 @ 12:33)    Specimen Source: .Blood Blood-Peripheral    Culture Results:   No growth at 1 day.    Culture - Blood (22 @ 10:59)    Specimen Source: .Blood Blood-Peripheral    Culture Results:   No growth at 1 day.        RADIOLOGY & ADDITIONAL STUDIES:    CT Abdomen and Pelvis w/ Oral Cont and w/ IV Cont (22 @ 09:55) >  ACC: [id number] EXAM:  CT ABDOMEN AND PELVIS OC IC                          PROCEDURE DATE:  2022          INTERPRETATION:  CLINICAL INFORMATION: Left lower quadrant pain and   tenderness to palpation. Diarrhea.    COMPARISON: None available.    CONTRAST/COMPLICATIONS:  IV Contrast: Isovue 370  90 cc administered  Oral Contrast: Positive oral contrast  Complications: None reported at time of study completion    PROCEDURE:  CT of the Abdomen and Pelvis was performed.  Sagittal and coronal reformats were performed.    FINDINGS:  LOWER CHEST: Minimal dependent atelectasis.    LIVER: Within normal limits.  BILE DUCTS: Normal caliber.  GALLBLADDER: Within normal limits.  SPLEEN: Mildly enlarged measuring up to 14 cm. Multiple hypodense   lesions, largest is heterogenous attenuation 5.0 x 4.7 cm (3:49)   additional lesions are smaller, mostly subcentimeter.  PANCREAS: Within normal limits.  ADRENALS: Within normal limits.  KIDNEYS/URETERS: Within normal limits.    BLADDER: Within normal limits.  REPRODUCTIVE ORGANS: Prostate within normal limits.    BOWEL: Small hiatal hernia. Acute diverticulitis distal left colon and   proximal sigmoid, wall thickening and extensive local regional   pericolonic fat infiltration confined to left lower quadrant. Small   extraluminal gas in left lower quadrant (3:121) and adjacent mild   peritoneal thickening and enhancement. No adjacent organized fluid   collection/abscess. No bowel obstruction. Appendix is normal.  PERITONEUM: Small ascites in christy hepatis and left paracolic gutter and   pelvis.  VESSELS: Within normal limits.  RETROPERITONEUM/LYMPH NODES: No lymphadenopathy.  ABDOMINAL WALL: Within normal limits.  BONES: Within normal limits.    IMPRESSION:  1.  Acute diverticulitis distal left colon/proximal sigmoid with   microperforation and focal peritonitis in left lower quadrant.  2.  Mildly enlarged spleen with 5 cm splenic mass and few smaller   hypodense splenic lesions, indeterminate, possibly benign such as   hemangioma, malignancy cannot be excluded. In the absence of prior   imaging to confirm long-term stability, recommend outpatient follow-up   with contrast-enhanced MRI abdomen for further characterization.

## 2022-08-05 NOTE — PROGRESS NOTE ADULT - SUBJECTIVE AND OBJECTIVE BOX
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 Patient has had 3 previous episodes of uncomplicated diverticulitis managed outpatient with antibiotics. Presents today with acute onset intense, sharp, constant 10/10 LLQ abdominal pain, non radiating. Sent to ED by Dr Poole has perforated diverticulitis admitted under Dr Deluca and had surgery last night      PMH: Diverticulitis (3 episodes of uncomplicated)   PSH: Right knee MCL/ACL/Meniscus repair (2022)  MED: Propecia  ALL: NKDA  SH: non smoker, daily ETOH use, negative CAGE questionnaire   FH: no personal or family history of CRC/IBD  Functional capacity: >4 METS    In the ED:  - afebrile, non tachycardic, normotensive  - Labs wnl  - CTAP demonstrated:  1.  Acute diverticulitis distal left colon/proximal sigmoid with   microperforation and focal peritonitis in left lower quadrant.  2.  Mildly enlarged spleen with 5 cm splenic mass and few smaller   hypodense splenic lesions, indeterminate, possibly benign such as   hemangioma, malignancy cannot be excluded. In the absence of prior   imaging to confirm long-term stability, recommend outpatient follow-up   with contrast-enhanced MRI abdomen for further characterization.   (04 Aug 2022 11:08)      REVIEW OF SYSTEMS:  Constitutional: No fever,  ENMT:  No difficulty hearing, tinnitus, vertigo; NGT  Respiratory: No cough, wheezing, chills or hemoptysis  Cardiovascular: No chest pain, palpitations, dizziness or leg swelling  Gastrointestinal: No abdominal or epigastric pain currently. No nausea, vomiting or hematemesis; No diarrhea or constipation. No melena or hematochezia.  Skin: No itching, burning, rashes or lesions   Musculoskeletal: No joint pain or swelling; No muscle, back or extremity pain    PAST MEDICAL & SURGICAL HISTORY:  Diverticulitis      H/O knee surgery          FAMILY HISTORY:  No pertinent family history in first degree relatives        SOCIAL HISTORY:  Smoking Status: [ ] Current, [ ] Former, [ ] Never  Pack Years:    MEDICATIONS:  MEDICATIONS  (STANDING):  acetaminophen   IVPB .. 1000 milliGRAM(s) IV Intermittent once  lactated ringers. 1000 milliLiter(s) (130 mL/Hr) IV Continuous <Continuous>  piperacillin/tazobactam IVPB.. 3.375 Gram(s) IV Intermittent every 6 hours    MEDICATIONS  (PRN):  HYDROmorphone  Injectable 0.5 milliGRAM(s) IV Push every 4 hours PRN Severe Pain (7 - 10)  HYDROmorphone  Injectable 1 milliGRAM(s) IV Push once PRN Breakthrough pain  ondansetron Injectable 4 milliGRAM(s) IV Push every 6 hours PRN Nausea and/or Vomiting      Allergies    No Known Allergies    Intolerances        Vital Signs Last 24 Hrs  T(C): 37.1 (05 Aug 2022 09:04), Max: 37.8 (04 Aug 2022 20:20)  T(F): 98.7 (05 Aug 2022 09:04), Max: 100.1 (04 Aug 2022 20:20)  HR: 80 (05 Aug 2022 08:16) (80 - 121)  BP: 128/70 (05 Aug 2022 08:16) (119/66 - 164/85)  BP(mean): 91 (05 Aug 2022 08:16) (85 - 118)  RR: 17 (05 Aug 2022 08:16) (9 - 18)  SpO2: 92% (05 Aug 2022 08:16) (92% - 99%)    Parameters below as of 05 Aug 2022 08:16  Patient On (Oxygen Delivery Method): room air        08-04 @ 07:01  -  08-05 @ 07:00  --------------------------------------------------------  IN: 3240 mL / OUT: 1400 mL / NET: 1840 mL          PHYSICAL EXAM:    General: Well developed; well nourished; in no acute distress  HEENT: MMM, conjunctiva and sclera clear  Lungs: clear  Heart: regular  Gastrointestinal: Soft, flat non-distended; +/-l bowel sounds; wound clean, ostomy pink LLQ  Extremities: Normal range of motion, No clubbing, cyanosis or edema  Neurological: Alert and oriented x3  Skin: Warm and dry. No obvious rash      LABS:                        14.7   10.28 )-----------( 257      ( 04 Aug 2022 07:49 )             41.9     08-04    138  |  103  |  11  ----------------------------<  125<H>  3.9   |  24  |  0.73    Ca    9.1      04 Aug 2022 07:49        Culture Results:   No growth at 12 hours (08-04 @ 12:33)  Culture Results:   No growth at 12 hours (08-04 @ 10:59)      RADIOLOGY & ADDITIONAL STUDIES:

## 2022-08-05 NOTE — PATIENT PROFILE ADULT - FALL HARM RISK - HARM RISK INTERVENTIONS

## 2022-08-05 NOTE — BRIEF OPERATIVE NOTE - OPERATION/FINDINGS
Midline laparotomy, 100cc of pus aspirate from deep pelvis  Sigmoid colon mobilised, diseased portion isolated with 2x JN stapler  Mesentery taken with ligasure.   Sepra film applied between retroperitoneum and colon and between colon and anterior abdominal wall  Fascia closed with looped PDS  End colostomy matured in LLQ.

## 2022-08-05 NOTE — CONSULT NOTE ADULT - ASSESSMENT
RECOMMEND  Continue Pip-Tazo Perforated diverticulitis with significant antibiotic exposure due to prior episodes of diverticulitis   S.P Sabas's procedure  Incidentally found splenic mass of unclear origin - need follow up / further investigation  Low grade fever      RECOMMEND  Continue Pip-Tazo while awaiting OR cultures        [phone]

## 2022-08-05 NOTE — PROGRESS NOTE ADULT - SUBJECTIVE AND OBJECTIVE BOX
STATUS POST:  Hartmans procedure     POST OPERATIVE DAY #: 1    SUBJECTIVE: Pt seen and examined at bedside this am by surgery team. Feels much improved from yesterday. Pain well controlled. NG Tube with minimal output overnight. voiding well. Denies f/n/v/cp/sob.    Vital Signs Last 24 Hrs  T(C): 37.1 (05 Aug 2022 09:04), Max: 37.8 (04 Aug 2022 20:20)  T(F): 98.7 (05 Aug 2022 09:04), Max: 100.1 (04 Aug 2022 20:20)  HR: 80 (05 Aug 2022 08:16) (80 - 121)  BP: 128/70 (05 Aug 2022 08:16) (119/66 - 164/85)  BP(mean): 91 (05 Aug 2022 08:16) (85 - 118)  RR: 17 (05 Aug 2022 08:16) (9 - 18)  SpO2: 92% (05 Aug 2022 08:16) (92% - 99%)    Parameters below as of 05 Aug 2022 08:16  Patient On (Oxygen Delivery Method): room air        PHYSICAL EXAM:  Constitutional: awake, alert, NAD, resting comfortably   Respiratory: non labored breathing, no respiratory distress  Cardiovascular: NSR, RRR  Gastrointestinal: soft, appropriately tender, non distended, no rebound, no guarding, midline dressing c/d/i, ostomy to L of midline   Genitourinary: blakely   Extremities: (-) edema, wwp, SCDs in place           I&O's Detail    04 Aug 2022 07:01  -  05 Aug 2022 07:00  --------------------------------------------------------  IN:    IV PiggyBack: 200 mL    Lactated Ringers: 3040 mL  Total IN: 3240 mL    OUT:    Blood Loss (mL): 100 mL    Indwelling Catheter - Urethral (mL): 300 mL    Nasogastric/Oral tube (mL): 0 mL    Voided (mL): 1000 mL  Total OUT: 1400 mL    Total NET: 1840 mL          LABS:                        14.7   10.28 )-----------( 257      ( 04 Aug 2022 07:49 )             41.9     08-04    138  |  103  |  11  ----------------------------<  125<H>  3.9   |  24  |  0.73    Ca    9.1      04 Aug 2022 07:49      PT/INR - ( 04 Aug 2022 11:28 )   PT: 12.6 sec;   INR: 1.06          PTT - ( 04 Aug 2022 11:28 )  PTT:28.6 sec  Urinalysis Basic - ( 04 Aug 2022 09:22 )    Color: Yellow / Appearance: Clear / S.015 / pH: x  Gluc: x / Ketone: NEGATIVE  / Bili: Negative / Urobili: 0.2 E.U./dL   Blood: x / Protein: NEGATIVE mg/dL / Nitrite: NEGATIVE   Leuk Esterase: NEGATIVE / RBC: x / WBC x   Sq Epi: x / Non Sq Epi: x / Bacteria: x        RADIOLOGY & ADDITIONAL STUDIES:

## 2022-08-05 NOTE — PROGRESS NOTE ADULT - ASSESSMENT
perforated diverticulitis with peritonitis s/o colostomy  antibiotics per ID  NGT, IVF , NPO  appreciate surgical team

## 2022-08-06 LAB
ANION GAP SERPL CALC-SCNC: 8 MMOL/L — SIGNIFICANT CHANGE UP (ref 5–17)
BUN SERPL-MCNC: 9 MG/DL — SIGNIFICANT CHANGE UP (ref 7–23)
CALCIUM SERPL-MCNC: 8.5 MG/DL — SIGNIFICANT CHANGE UP (ref 8.4–10.5)
CHLORIDE SERPL-SCNC: 101 MMOL/L — SIGNIFICANT CHANGE UP (ref 96–108)
CO2 SERPL-SCNC: 27 MMOL/L — SIGNIFICANT CHANGE UP (ref 22–31)
CREAT SERPL-MCNC: 0.88 MG/DL — SIGNIFICANT CHANGE UP (ref 0.5–1.3)
EGFR: 110 ML/MIN/1.73M2 — SIGNIFICANT CHANGE UP
GLUCOSE SERPL-MCNC: 113 MG/DL — HIGH (ref 70–99)
HCT VFR BLD CALC: 32.1 % — LOW (ref 39–50)
HCT VFR BLD CALC: 35.2 % — LOW (ref 39–50)
HGB BLD-MCNC: 10.8 G/DL — LOW (ref 13–17)
HGB BLD-MCNC: 12 G/DL — LOW (ref 13–17)
MAGNESIUM SERPL-MCNC: 2 MG/DL — SIGNIFICANT CHANGE UP (ref 1.6–2.6)
MCHC RBC-ENTMCNC: 31.2 PG — SIGNIFICANT CHANGE UP (ref 27–34)
MCHC RBC-ENTMCNC: 31.2 PG — SIGNIFICANT CHANGE UP (ref 27–34)
MCHC RBC-ENTMCNC: 33.6 GM/DL — SIGNIFICANT CHANGE UP (ref 32–36)
MCHC RBC-ENTMCNC: 34.1 GM/DL — SIGNIFICANT CHANGE UP (ref 32–36)
MCV RBC AUTO: 91.4 FL — SIGNIFICANT CHANGE UP (ref 80–100)
MCV RBC AUTO: 92.8 FL — SIGNIFICANT CHANGE UP (ref 80–100)
NRBC # BLD: 0 /100 WBCS — SIGNIFICANT CHANGE UP (ref 0–0)
NRBC # BLD: 0 /100 WBCS — SIGNIFICANT CHANGE UP (ref 0–0)
PHOSPHATE SERPL-MCNC: 2.5 MG/DL — SIGNIFICANT CHANGE UP (ref 2.5–4.5)
PLATELET # BLD AUTO: 213 K/UL — SIGNIFICANT CHANGE UP (ref 150–400)
PLATELET # BLD AUTO: 215 K/UL — SIGNIFICANT CHANGE UP (ref 150–400)
POTASSIUM SERPL-MCNC: 3.7 MMOL/L — SIGNIFICANT CHANGE UP (ref 3.5–5.3)
POTASSIUM SERPL-SCNC: 3.7 MMOL/L — SIGNIFICANT CHANGE UP (ref 3.5–5.3)
RBC # BLD: 3.46 M/UL — LOW (ref 4.2–5.8)
RBC # BLD: 3.85 M/UL — LOW (ref 4.2–5.8)
RBC # FLD: 12.1 % — SIGNIFICANT CHANGE UP (ref 10.3–14.5)
RBC # FLD: 12.3 % — SIGNIFICANT CHANGE UP (ref 10.3–14.5)
SODIUM SERPL-SCNC: 136 MMOL/L — SIGNIFICANT CHANGE UP (ref 135–145)
WBC # BLD: 6.57 K/UL — SIGNIFICANT CHANGE UP (ref 3.8–10.5)
WBC # BLD: 7.08 K/UL — SIGNIFICANT CHANGE UP (ref 3.8–10.5)
WBC # FLD AUTO: 6.57 K/UL — SIGNIFICANT CHANGE UP (ref 3.8–10.5)
WBC # FLD AUTO: 7.08 K/UL — SIGNIFICANT CHANGE UP (ref 3.8–10.5)

## 2022-08-06 RX ORDER — KETOROLAC TROMETHAMINE 30 MG/ML
15 SYRINGE (ML) INJECTION EVERY 6 HOURS
Refills: 0 | Status: DISCONTINUED | OUTPATIENT
Start: 2022-08-06 | End: 2022-08-09

## 2022-08-06 RX ORDER — POTASSIUM CHLORIDE 20 MEQ
10 PACKET (EA) ORAL
Refills: 0 | Status: COMPLETED | OUTPATIENT
Start: 2022-08-06 | End: 2022-08-06

## 2022-08-06 RX ORDER — ZOLPIDEM TARTRATE 10 MG/1
5 TABLET ORAL AT BEDTIME
Refills: 0 | Status: DISCONTINUED | OUTPATIENT
Start: 2022-08-06 | End: 2022-08-08

## 2022-08-06 RX ORDER — FINASTERIDE 5 MG/1
1 TABLET, FILM COATED ORAL EVERY 24 HOURS
Refills: 0 | Status: DISCONTINUED | OUTPATIENT
Start: 2022-08-06 | End: 2022-08-07

## 2022-08-06 RX ORDER — ZOLPIDEM TARTRATE 10 MG/1
5 TABLET ORAL AT BEDTIME
Refills: 0 | Status: DISCONTINUED | OUTPATIENT
Start: 2022-08-06 | End: 2022-08-07

## 2022-08-06 RX ADMIN — Medication 15 MILLIGRAM(S): at 23:04

## 2022-08-06 RX ADMIN — PIPERACILLIN AND TAZOBACTAM 200 GRAM(S): 4; .5 INJECTION, POWDER, LYOPHILIZED, FOR SOLUTION INTRAVENOUS at 06:19

## 2022-08-06 RX ADMIN — PIPERACILLIN AND TAZOBACTAM 200 GRAM(S): 4; .5 INJECTION, POWDER, LYOPHILIZED, FOR SOLUTION INTRAVENOUS at 01:32

## 2022-08-06 RX ADMIN — PIPERACILLIN AND TAZOBACTAM 200 GRAM(S): 4; .5 INJECTION, POWDER, LYOPHILIZED, FOR SOLUTION INTRAVENOUS at 19:00

## 2022-08-06 RX ADMIN — HEPARIN SODIUM 5000 UNIT(S): 5000 INJECTION INTRAVENOUS; SUBCUTANEOUS at 06:18

## 2022-08-06 RX ADMIN — PIPERACILLIN AND TAZOBACTAM 200 GRAM(S): 4; .5 INJECTION, POWDER, LYOPHILIZED, FOR SOLUTION INTRAVENOUS at 13:49

## 2022-08-06 RX ADMIN — Medication 15 MILLIGRAM(S): at 17:50

## 2022-08-06 RX ADMIN — Medication 15 MILLIGRAM(S): at 14:25

## 2022-08-06 RX ADMIN — HYDROMORPHONE HYDROCHLORIDE 0.5 MILLIGRAM(S): 2 INJECTION INTRAMUSCULAR; INTRAVENOUS; SUBCUTANEOUS at 21:36

## 2022-08-06 RX ADMIN — HYDROMORPHONE HYDROCHLORIDE 1 MILLIGRAM(S): 2 INJECTION INTRAMUSCULAR; INTRAVENOUS; SUBCUTANEOUS at 13:41

## 2022-08-06 RX ADMIN — Medication 100 MILLIEQUIVALENT(S): at 17:53

## 2022-08-06 RX ADMIN — Medication 100 MILLIEQUIVALENT(S): at 13:50

## 2022-08-06 RX ADMIN — HYDROMORPHONE HYDROCHLORIDE 1 MILLIGRAM(S): 2 INJECTION INTRAMUSCULAR; INTRAVENOUS; SUBCUTANEOUS at 03:01

## 2022-08-06 RX ADMIN — SODIUM CHLORIDE 130 MILLILITER(S): 9 INJECTION, SOLUTION INTRAVENOUS at 21:38

## 2022-08-06 RX ADMIN — HYDROMORPHONE HYDROCHLORIDE 0.5 MILLIGRAM(S): 2 INJECTION INTRAMUSCULAR; INTRAVENOUS; SUBCUTANEOUS at 22:29

## 2022-08-06 RX ADMIN — Medication 15 MILLIGRAM(S): at 13:50

## 2022-08-06 RX ADMIN — HYDROMORPHONE HYDROCHLORIDE 1 MILLIGRAM(S): 2 INJECTION INTRAMUSCULAR; INTRAVENOUS; SUBCUTANEOUS at 09:14

## 2022-08-06 RX ADMIN — HEPARIN SODIUM 5000 UNIT(S): 5000 INJECTION INTRAVENOUS; SUBCUTANEOUS at 21:06

## 2022-08-06 RX ADMIN — Medication 650 MILLIGRAM(S): at 21:35

## 2022-08-06 RX ADMIN — HYDROMORPHONE HYDROCHLORIDE 1 MILLIGRAM(S): 2 INJECTION INTRAMUSCULAR; INTRAVENOUS; SUBCUTANEOUS at 03:33

## 2022-08-06 RX ADMIN — Medication 650 MILLIGRAM(S): at 21:06

## 2022-08-06 RX ADMIN — Medication 100 MILLIEQUIVALENT(S): at 15:37

## 2022-08-06 RX ADMIN — HEPARIN SODIUM 5000 UNIT(S): 5000 INJECTION INTRAVENOUS; SUBCUTANEOUS at 13:50

## 2022-08-06 RX ADMIN — Medication 15 MILLIGRAM(S): at 18:05

## 2022-08-06 RX ADMIN — SODIUM CHLORIDE 130 MILLILITER(S): 9 INJECTION, SOLUTION INTRAVENOUS at 15:36

## 2022-08-06 NOTE — PROGRESS NOTE ADULT - SUBJECTIVE AND OBJECTIVE BOX
INTERVAL HPI/OVERNIGHT EVENTS:    ANTIBIOTICS/RELEVANT:    MEDICATIONS  (STANDING):  finasteride 1 milliGRAM(s) Oral every 24 hours  heparin   Injectable 5000 Unit(s) SubCutaneous every 8 hours  ketorolac   Injectable 15 milliGRAM(s) IV Push every 6 hours  lactated ringers. 1000 milliLiter(s) (130 mL/Hr) IV Continuous <Continuous>  piperacillin/tazobactam IVPB.. 3.375 Gram(s) IV Intermittent every 6 hours  potassium chloride  10 mEq/100 mL IVPB 10 milliEquivalent(s) IV Intermittent every 1 hour    MEDICATIONS  (PRN):  acetaminophen     Tablet .. 650 milliGRAM(s) Oral every 6 hours PRN mild pain (1-3)  HYDROmorphone  Injectable 0.5 milliGRAM(s) IV Push every 4 hours PRN Moderate Pain (4 - 6)  HYDROmorphone  Injectable 1 milliGRAM(s) IV Push every 4 hours PRN Severe Pain (7 - 10)  melatonin 5 milliGRAM(s) Oral at bedtime PRN Sleep  ondansetron Injectable 4 milliGRAM(s) IV Push every 6 hours PRN Nausea and/or Vomiting      Allergies    No Known Allergies    Intolerances        Vital Signs Last 24 Hrs  T(C): 37.1 (06 Aug 2022 15:48), Max: 37.1 (05 Aug 2022 18:06)  T(F): 98.7 (06 Aug 2022 15:48), Max: 98.8 (06 Aug 2022 14:00)  HR: 83 (06 Aug 2022 15:48) (64 - 98)  BP: 132/84 (06 Aug 2022 15:48) (116/61 - 134/87)  BP(mean): 92 (06 Aug 2022 08:21) (82 - 106)  RR: 14 (06 Aug 2022 15:48) (14 - 18)  SpO2: 96% (06 Aug 2022 15:48) (91% - 96%)    Parameters below as of 06 Aug 2022 15:48  Patient On (Oxygen Delivery Method): room air              LABS:                        10.8   6.57  )-----------( 213      ( 06 Aug 2022 05:30 )             32.1     08-06    136  |  101  |  9   ----------------------------<  113<H>  3.7   |  27  |  0.88    Ca    8.5      06 Aug 2022 05:30  Phos  2.5     08-06  Mg     2.0     08-06            MICROBIOLOGY:    Culture - Surgical Swab (08.04.22 @ 23:39)    Gram Stain:   Rare Gram positive cocci in pairs  Moderate to Numerous White blood cells    Specimen Source: .Surgical Swab Intra-abdominal puss    Culture Results:   Rare Escherichia coli  Rare Morganella morganii  Susceptibility to follow.        RADIOLOGY & ADDITIONAL STUDIES: INTERVAL HPI/OVERNIGHT EVENTS:    Abdominal pain earlier today during dressing change but now much better  No flatus  No N/V      MEDICATIONS  (STANDING):  finasteride 1 milliGRAM(s) Oral every 24 hours  heparin   Injectable 5000 Unit(s) SubCutaneous every 8 hours  ketorolac   Injectable 15 milliGRAM(s) IV Push every 6 hours  lactated ringers. 1000 milliLiter(s) (130 mL/Hr) IV Continuous <Continuous>  piperacillin/tazobactam IVPB.. 3.375 Gram(s) IV Intermittent every 6 hours  potassium chloride  10 mEq/100 mL IVPB 10 milliEquivalent(s) IV Intermittent every 1 hour    MEDICATIONS  (PRN):  acetaminophen     Tablet .. 650 milliGRAM(s) Oral every 6 hours PRN mild pain (1-3)  HYDROmorphone  Injectable 0.5 milliGRAM(s) IV Push every 4 hours PRN Moderate Pain (4 - 6)  HYDROmorphone  Injectable 1 milliGRAM(s) IV Push every 4 hours PRN Severe Pain (7 - 10)  melatonin 5 milliGRAM(s) Oral at bedtime PRN Sleep  ondansetron Injectable 4 milliGRAM(s) IV Push every 6 hours PRN Nausea and/or Vomiting      Allergies    No Known Allergies      EXAM  Vital Signs Last 24 Hrs  T(C): 37.1 (06 Aug 2022 15:48), Max: 37.1 (05 Aug 2022 18:06)  T(F): 98.7 (06 Aug 2022 15:48), Max: 98.8 (06 Aug 2022 14:00)  HR: 83 (06 Aug 2022 15:48) (64 - 98)  BP: 132/84 (06 Aug 2022 15:48) (116/61 - 134/87)  BP(mean): 92 (06 Aug 2022 08:21) (82 - 106)  RR: 14 (06 Aug 2022 15:48) (14 - 18)  SpO2: 96% (06 Aug 2022 15:48) (91% - 96%)    Parameters below as of 06 Aug 2022 15:48  Patient On (Oxygen Delivery Method): room air  Awake and alert  No distress  No rash  RRR  Chest CTA  Abd softly distended and appropriately tender to palpation  LEs no edema        LABS:                        10.8   6.57  )-----------( 213      ( 06 Aug 2022 05:30 )             32.1     08-06    136  |  101  |  9   ----------------------------<  113<H>  3.7   |  27  |  0.88    Ca    8.5      06 Aug 2022 05:30  Phos  2.5     08-06  Mg     2.0     08-06            MICROBIOLOGY:    Culture - Surgical Swab (08.04.22 @ 23:39)    Gram Stain:   Rare Gram positive cocci in pairs  Moderate to Numerous White blood cells    Specimen Source: .Surgical Swab Intra-abdominal puss    Culture Results:   Rare Escherichia coli  Rare Morganella morganii  Susceptibility to follow.

## 2022-08-06 NOTE — PROGRESS NOTE ADULT - SUBJECTIVE AND OBJECTIVE BOX
INTERVAL HPI/OVERNIGHT EVENTS: passed TOV, NAEON, TTP RLQ, soft, nd. encouraged IS, no ostomy output/bowel sweat    STATUS POST: Sabas's procedure    POST OPERATIVE DAY #: 2    SUBJECTIVE: Patient seen and examined at bedside with chief. He c/o pain worsened by dressing change but otherwise denies n/v, cp, sob.       heparin   Injectable 5000 Unit(s) SubCutaneous every 8 hours  piperacillin/tazobactam IVPB.. 3.375 Gram(s) IV Intermittent every 6 hours      Vital Signs Last 24 Hrs  T(C): 37.5 (06 Aug 2022 23:40), Max: 37.5 (06 Aug 2022 23:40)  T(F): 99.5 (06 Aug 2022 23:40), Max: 99.5 (06 Aug 2022 23:40)  HR: 81 (06 Aug 2022 23:40) (64 - 89)  BP: 115/67 (06 Aug 2022 23:40) (115/67 - 143/86)  BP(mean): 83 (06 Aug 2022 23:40) (82 - 92)  RR: 17 (06 Aug 2022 23:40) (14 - 17)  SpO2: 95% (06 Aug 2022 23:40) (92% - 96%)    Parameters below as of 06 Aug 2022 23:40  Patient On (Oxygen Delivery Method): room air      I&O's Detail    05 Aug 2022 07:01  -  06 Aug 2022 07:00  --------------------------------------------------------  IN:    IV PiggyBack: 200 mL    Lactated Ringers: 3120 mL  Total IN: 3320 mL    OUT:    Colostomy (mL): 25 mL    Indwelling Catheter - Urethral (mL): 1750 mL    Nasogastric/Oral tube (mL): 0 mL    Voided (mL): 1000 mL  Total OUT: 2775 mL    Total NET: 545 mL      06 Aug 2022 07:01  -  07 Aug 2022 00:54  --------------------------------------------------------  IN:    IV PiggyBack: 100 mL    Lactated Ringers: 1430 mL  Total IN: 1530 mL    OUT:    Colostomy (mL): 0 mL    Voided (mL): 1000 mL  Total OUT: 1000 mL    Total NET: 530 mL          General: NAD, resting comfortably in bed  C/V: NSR  Pulm: Nonlabored breathing, no respiratory distress  Abd: soft, moderately distended, ttp in RLQ. Ostomy ppp with bowel sweat.  Extrem: WWP, no edema, SCDs in place        LABS:                        12.0   7.08  )-----------( 215      ( 06 Aug 2022 17:45 )             35.2     08-06    136  |  101  |  9   ----------------------------<  113<H>  3.7   |  27  |  0.88    Ca    8.5      06 Aug 2022 05:30  Phos  2.5     08-06  Mg     2.0     08-06            RADIOLOGY & ADDITIONAL STUDIES:

## 2022-08-06 NOTE — PROGRESS NOTE ADULT - ASSESSMENT
42 M POD2 Hartmans procedure on 8/4.    - NPO/IVF   - Abx: zosyn; s/p CTX 2g, MTD  - f/u ostomy output  - Stepdown to regional if patient's pain improves  - Pain and Nausea control  - SCD/SQH  - ostomy consult  - IS/OOB  - AM labs

## 2022-08-06 NOTE — PROGRESS NOTE ADULT - ASSESSMENT
Perforated diverticulitis with focal peritonitis  S/P Sabas's procedure  Splenic lesion of unclear cause  Recovering after surgery      RECOMMEND  Continue Pip-Tazo  Further work-up of splenic mass      [phone]

## 2022-08-06 NOTE — PROGRESS NOTE ADULT - SUBJECTIVE AND OBJECTIVE BOX
Pt seen and examined   ngt out  has output via ostomy    REVIEW OF SYSTEMS:  Constitutional: No fever,   Cardiovascular: No chest pain, palpitations,   Gastrointestinal: currently No abdominal or epigastric pain. No nausea, vomiting or hematemesis;   Skin: No itching, burning, rashes or lesions       MEDICATIONS:  MEDICATIONS  (STANDING):  finasteride 1 milliGRAM(s) Oral every 24 hours  heparin   Injectable 5000 Unit(s) SubCutaneous every 8 hours  ketorolac   Injectable 15 milliGRAM(s) IV Push every 6 hours  lactated ringers. 1000 milliLiter(s) (130 mL/Hr) IV Continuous <Continuous>  piperacillin/tazobactam IVPB.. 3.375 Gram(s) IV Intermittent every 6 hours  potassium chloride  10 mEq/100 mL IVPB 10 milliEquivalent(s) IV Intermittent every 1 hour    MEDICATIONS  (PRN):  acetaminophen     Tablet .. 650 milliGRAM(s) Oral every 6 hours PRN mild pain (1-3)  HYDROmorphone  Injectable 0.5 milliGRAM(s) IV Push every 4 hours PRN Moderate Pain (4 - 6)  HYDROmorphone  Injectable 1 milliGRAM(s) IV Push every 4 hours PRN Severe Pain (7 - 10)  melatonin 5 milliGRAM(s) Oral at bedtime PRN Sleep  ondansetron Injectable 4 milliGRAM(s) IV Push every 6 hours PRN Nausea and/or Vomiting      Allergies    No Known Allergies    Intolerances        Vital Signs Last 24 Hrs  T(C): 36.8 (06 Aug 2022 09:00), Max: 37.2 (05 Aug 2022 13:48)  T(F): 98.3 (06 Aug 2022 09:00), Max: 98.9 (05 Aug 2022 13:48)  HR: 82 (06 Aug 2022 08:21) (64 - 98)  BP: 124/74 (06 Aug 2022 08:21) (116/61 - 134/87)  BP(mean): 92 (06 Aug 2022 08:21) (82 - 106)  RR: 17 (06 Aug 2022 08:21) (16 - 18)  SpO2: 93% (06 Aug 2022 08:21) (91% - 93%)    Parameters below as of 06 Aug 2022 08:21  Patient On (Oxygen Delivery Method): room air        08-05 @ 07:01  -  08-06 @ 07:00  --------------------------------------------------------  IN: 3320 mL / OUT: 2775 mL / NET: 545 mL        PHYSICAL EXAM:    General: Well developed; well nourished; in no acute distress  HEENT: MMM, conjunctiva and sclera clear  Lungs: clear  Heart: regular  Gastrointestinal: Soft non-tender non-distended; scarce bowel sounds; ostomy pink  Skin: Warm and dry. No obvious rash    LABS:      CBC Full  -  ( 06 Aug 2022 05:30 )  WBC Count : 6.57 K/uL  RBC Count : 3.46 M/uL  Hemoglobin : 10.8 g/dL  Hematocrit : 32.1 %  Platelet Count - Automated : 213 K/uL  Mean Cell Volume : 92.8 fl  Mean Cell Hemoglobin : 31.2 pg  Mean Cell Hemoglobin Concentration : 33.6 gm/dL  Auto Neutrophil # : x  Auto Lymphocyte # : x  Auto Monocyte # : x  Auto Eosinophil # : x  Auto Basophil # : x  Auto Neutrophil % : x  Auto Lymphocyte % : x  Auto Monocyte % : x  Auto Eosinophil % : x  Auto Basophil % : x    08-06    136  |  101  |  9   ----------------------------<  113<H>  3.7   |  27  |  0.88    Ca    8.5      06 Aug 2022 05:30  Phos  2.5     08-06  Mg     2.0     08-06                        RADIOLOGY & ADDITIONAL STUDIES (The following images were personally reviewed):

## 2022-08-07 LAB
-  AMPICILLIN/SULBACTAM: SIGNIFICANT CHANGE UP
-  AMPICILLIN: SIGNIFICANT CHANGE UP
-  CEFAZOLIN: SIGNIFICANT CHANGE UP
-  CEFEPIME: SIGNIFICANT CHANGE UP
-  CEFTRIAXONE: SIGNIFICANT CHANGE UP
-  CIPROFLOXACIN: SIGNIFICANT CHANGE UP
-  ERTAPENEM: SIGNIFICANT CHANGE UP
-  GENTAMICIN: SIGNIFICANT CHANGE UP
-  PIPERACILLIN/TAZOBACTAM: SIGNIFICANT CHANGE UP
-  TOBRAMYCIN: SIGNIFICANT CHANGE UP
-  TRIMETHOPRIM/SULFAMETHOXAZOLE: SIGNIFICANT CHANGE UP
ANION GAP SERPL CALC-SCNC: 11 MMOL/L — SIGNIFICANT CHANGE UP (ref 5–17)
BUN SERPL-MCNC: 8 MG/DL — SIGNIFICANT CHANGE UP (ref 7–23)
CALCIUM SERPL-MCNC: 8.8 MG/DL — SIGNIFICANT CHANGE UP (ref 8.4–10.5)
CHLORIDE SERPL-SCNC: 101 MMOL/L — SIGNIFICANT CHANGE UP (ref 96–108)
CO2 SERPL-SCNC: 25 MMOL/L — SIGNIFICANT CHANGE UP (ref 22–31)
CREAT SERPL-MCNC: 0.76 MG/DL — SIGNIFICANT CHANGE UP (ref 0.5–1.3)
CULTURE RESULTS: SIGNIFICANT CHANGE UP
EGFR: 115 ML/MIN/1.73M2 — SIGNIFICANT CHANGE UP
GLUCOSE SERPL-MCNC: 106 MG/DL — HIGH (ref 70–99)
HCT VFR BLD CALC: 35.3 % — LOW (ref 39–50)
HGB BLD-MCNC: 12 G/DL — LOW (ref 13–17)
MAGNESIUM SERPL-MCNC: 1.8 MG/DL — SIGNIFICANT CHANGE UP (ref 1.6–2.6)
MCHC RBC-ENTMCNC: 31.3 PG — SIGNIFICANT CHANGE UP (ref 27–34)
MCHC RBC-ENTMCNC: 34 GM/DL — SIGNIFICANT CHANGE UP (ref 32–36)
MCV RBC AUTO: 91.9 FL — SIGNIFICANT CHANGE UP (ref 80–100)
METHOD TYPE: SIGNIFICANT CHANGE UP
NRBC # BLD: 0 /100 WBCS — SIGNIFICANT CHANGE UP (ref 0–0)
ORGANISM # SPEC MICROSCOPIC CNT: SIGNIFICANT CHANGE UP
PHOSPHATE SERPL-MCNC: 2.9 MG/DL — SIGNIFICANT CHANGE UP (ref 2.5–4.5)
PLATELET # BLD AUTO: 224 K/UL — SIGNIFICANT CHANGE UP (ref 150–400)
POTASSIUM SERPL-MCNC: 3.7 MMOL/L — SIGNIFICANT CHANGE UP (ref 3.5–5.3)
POTASSIUM SERPL-SCNC: 3.7 MMOL/L — SIGNIFICANT CHANGE UP (ref 3.5–5.3)
RBC # BLD: 3.84 M/UL — LOW (ref 4.2–5.8)
RBC # FLD: 11.9 % — SIGNIFICANT CHANGE UP (ref 10.3–14.5)
SODIUM SERPL-SCNC: 137 MMOL/L — SIGNIFICANT CHANGE UP (ref 135–145)
SPECIMEN SOURCE: SIGNIFICANT CHANGE UP
WBC # BLD: 6.35 K/UL — SIGNIFICANT CHANGE UP (ref 3.8–10.5)
WBC # FLD AUTO: 6.35 K/UL — SIGNIFICANT CHANGE UP (ref 3.8–10.5)

## 2022-08-07 RX ORDER — POTASSIUM CHLORIDE 20 MEQ
30 PACKET (EA) ORAL ONCE
Refills: 0 | Status: COMPLETED | OUTPATIENT
Start: 2022-08-07 | End: 2022-08-07

## 2022-08-07 RX ORDER — OXYCODONE HYDROCHLORIDE 5 MG/1
10 TABLET ORAL EVERY 6 HOURS
Refills: 0 | Status: DISCONTINUED | OUTPATIENT
Start: 2022-08-07 | End: 2022-08-08

## 2022-08-07 RX ORDER — ZOLPIDEM TARTRATE 10 MG/1
10 TABLET ORAL AT BEDTIME
Refills: 0 | Status: DISCONTINUED | OUTPATIENT
Start: 2022-08-07 | End: 2022-08-07

## 2022-08-07 RX ORDER — ZOLPIDEM TARTRATE 10 MG/1
5 TABLET ORAL AT BEDTIME
Refills: 0 | Status: DISCONTINUED | OUTPATIENT
Start: 2022-08-07 | End: 2022-08-10

## 2022-08-07 RX ORDER — OXYCODONE HYDROCHLORIDE 5 MG/1
5 TABLET ORAL EVERY 6 HOURS
Refills: 0 | Status: DISCONTINUED | OUTPATIENT
Start: 2022-08-07 | End: 2022-08-08

## 2022-08-07 RX ORDER — BENZOCAINE AND MENTHOL 5; 1 G/100ML; G/100ML
1 LIQUID ORAL ONCE
Refills: 0 | Status: COMPLETED | OUTPATIENT
Start: 2022-08-07 | End: 2022-08-07

## 2022-08-07 RX ORDER — MAGNESIUM SULFATE 500 MG/ML
1 VIAL (ML) INJECTION ONCE
Refills: 0 | Status: COMPLETED | OUTPATIENT
Start: 2022-08-07 | End: 2022-08-07

## 2022-08-07 RX ORDER — HYDRALAZINE HCL 50 MG
10 TABLET ORAL ONCE
Refills: 0 | Status: DISCONTINUED | OUTPATIENT
Start: 2022-08-07 | End: 2022-08-07

## 2022-08-07 RX ADMIN — OXYCODONE HYDROCHLORIDE 5 MILLIGRAM(S): 5 TABLET ORAL at 09:34

## 2022-08-07 RX ADMIN — Medication 15 MILLIGRAM(S): at 05:26

## 2022-08-07 RX ADMIN — PIPERACILLIN AND TAZOBACTAM 200 GRAM(S): 4; .5 INJECTION, POWDER, LYOPHILIZED, FOR SOLUTION INTRAVENOUS at 17:47

## 2022-08-07 RX ADMIN — Medication 100 GRAM(S): at 09:26

## 2022-08-07 RX ADMIN — OXYCODONE HYDROCHLORIDE 5 MILLIGRAM(S): 5 TABLET ORAL at 09:32

## 2022-08-07 RX ADMIN — ZOLPIDEM TARTRATE 5 MILLIGRAM(S): 10 TABLET ORAL at 00:26

## 2022-08-07 RX ADMIN — ZOLPIDEM TARTRATE 5 MILLIGRAM(S): 10 TABLET ORAL at 23:05

## 2022-08-07 RX ADMIN — HEPARIN SODIUM 5000 UNIT(S): 5000 INJECTION INTRAVENOUS; SUBCUTANEOUS at 05:26

## 2022-08-07 RX ADMIN — BENZOCAINE AND MENTHOL 1 LOZENGE: 5; 1 LIQUID ORAL at 14:13

## 2022-08-07 RX ADMIN — Medication 15 MILLIGRAM(S): at 17:47

## 2022-08-07 RX ADMIN — HEPARIN SODIUM 5000 UNIT(S): 5000 INJECTION INTRAVENOUS; SUBCUTANEOUS at 14:12

## 2022-08-07 RX ADMIN — PIPERACILLIN AND TAZOBACTAM 200 GRAM(S): 4; .5 INJECTION, POWDER, LYOPHILIZED, FOR SOLUTION INTRAVENOUS at 01:18

## 2022-08-07 RX ADMIN — Medication 15 MILLIGRAM(S): at 23:06

## 2022-08-07 RX ADMIN — Medication 15 MILLIGRAM(S): at 12:21

## 2022-08-07 RX ADMIN — PIPERACILLIN AND TAZOBACTAM 200 GRAM(S): 4; .5 INJECTION, POWDER, LYOPHILIZED, FOR SOLUTION INTRAVENOUS at 23:06

## 2022-08-07 RX ADMIN — Medication 15 MILLIGRAM(S): at 12:18

## 2022-08-07 RX ADMIN — Medication 15 MILLIGRAM(S): at 23:18

## 2022-08-07 RX ADMIN — Medication 15 MILLIGRAM(S): at 00:27

## 2022-08-07 RX ADMIN — ONDANSETRON 4 MILLIGRAM(S): 8 TABLET, FILM COATED ORAL at 22:04

## 2022-08-07 RX ADMIN — PIPERACILLIN AND TAZOBACTAM 200 GRAM(S): 4; .5 INJECTION, POWDER, LYOPHILIZED, FOR SOLUTION INTRAVENOUS at 07:02

## 2022-08-07 RX ADMIN — Medication 30 MILLIEQUIVALENT(S): at 10:43

## 2022-08-07 RX ADMIN — Medication 15 MILLIGRAM(S): at 17:54

## 2022-08-07 RX ADMIN — PIPERACILLIN AND TAZOBACTAM 200 GRAM(S): 4; .5 INJECTION, POWDER, LYOPHILIZED, FOR SOLUTION INTRAVENOUS at 12:50

## 2022-08-07 RX ADMIN — HEPARIN SODIUM 5000 UNIT(S): 5000 INJECTION INTRAVENOUS; SUBCUTANEOUS at 22:18

## 2022-08-07 RX ADMIN — SODIUM CHLORIDE 130 MILLILITER(S): 9 INJECTION, SOLUTION INTRAVENOUS at 12:51

## 2022-08-07 RX ADMIN — Medication 15 MILLIGRAM(S): at 06:03

## 2022-08-07 RX ADMIN — SODIUM CHLORIDE 130 MILLILITER(S): 9 INJECTION, SOLUTION INTRAVENOUS at 05:26

## 2022-08-07 NOTE — PROGRESS NOTE ADULT - ASSESSMENT
Perforated diverticulitis   S/P Sabas's procedure  Recent right knee surgery, including ACL repair/reconstruction   Splenic mass      RECOMMEND    Continue Pip-Tazo  Anticipate conversion to PO antibiotics when solid food diet tolerated  Further evaluation of splenic mass  PT for right post-op knee      Discussed with surgical house staff and Dr Meeks    [phone]

## 2022-08-07 NOTE — PROGRESS NOTE ADULT - SUBJECTIVE AND OBJECTIVE BOX
INTERVAL HPI/OVERNIGHT EVENTS:    ANTIBIOTICS/RELEVANT:    MEDICATIONS  (STANDING):  benzocaine 15 mG/menthol 3.6 mG Lozenge 1 Lozenge Oral once  heparin   Injectable 5000 Unit(s) SubCutaneous every 8 hours  ketorolac   Injectable 15 milliGRAM(s) IV Push every 6 hours  lactated ringers. 1000 milliLiter(s) (130 mL/Hr) IV Continuous <Continuous>  piperacillin/tazobactam IVPB.. 3.375 Gram(s) IV Intermittent every 6 hours    MEDICATIONS  (PRN):  acetaminophen     Tablet .. 650 milliGRAM(s) Oral every 6 hours PRN mild pain (1-3)  melatonin 5 milliGRAM(s) Oral at bedtime PRN Sleep  ondansetron Injectable 4 milliGRAM(s) IV Push every 6 hours PRN Nausea and/or Vomiting  oxyCODONE    IR 5 milliGRAM(s) Oral every 6 hours PRN Moderate Pain (4 - 6)  oxyCODONE    IR 10 milliGRAM(s) Oral every 6 hours PRN Severe Pain (7 - 10)  zolpidem 5 milliGRAM(s) Oral at bedtime PRN Insomnia  zolpidem 5 milliGRAM(s) Oral at bedtime PRN Insomnia      Allergies    No Known Allergies    Intolerances        Vital Signs Last 24 Hrs  T(C): 37.1 (07 Aug 2022 08:19), Max: 37.5 (06 Aug 2022 23:40)  T(F): 98.7 (07 Aug 2022 08:19), Max: 99.5 (06 Aug 2022 23:40)  HR: 76 (07 Aug 2022 08:19) (76 - 89)  BP: 131/83 (07 Aug 2022 08:19) (115/67 - 143/86)  BP(mean): 83 (06 Aug 2022 23:40) (83 - 83)  RR: 17 (07 Aug 2022 08:19) (14 - 17)  SpO2: 97% (07 Aug 2022 08:19) (95% - 97%)    Parameters below as of 07 Aug 2022 08:19  Patient On (Oxygen Delivery Method): room air          LABS:                        12.0   6.35  )-----------( 224      ( 07 Aug 2022 07:38 )             35.3     08-07    137  |  101  |  8   ----------------------------<  106<H>  3.7   |  25  |  0.76    Ca    8.8      07 Aug 2022 07:38  Phos  2.9     08-07  Mg     1.8     08-07            MICROBIOLOGY:    RADIOLOGY & ADDITIONAL STUDIES: INTERVAL HPI/OVERNIGHT EVENTS:    Moved bowels and started clear diet  Ambulating but needs PT for his postop knee    MEDICATIONS  (STANDING):  benzocaine 15 mG/menthol 3.6 mG Lozenge 1 Lozenge Oral once  heparin   Injectable 5000 Unit(s) SubCutaneous every 8 hours  ketorolac   Injectable 15 milliGRAM(s) IV Push every 6 hours  lactated ringers. 1000 milliLiter(s) (130 mL/Hr) IV Continuous <Continuous>  piperacillin/tazobactam IVPB.. 3.375 Gram(s) IV Intermittent every 6 hours    MEDICATIONS  (PRN):  acetaminophen     Tablet .. 650 milliGRAM(s) Oral every 6 hours PRN mild pain (1-3)  melatonin 5 milliGRAM(s) Oral at bedtime PRN Sleep  ondansetron Injectable 4 milliGRAM(s) IV Push every 6 hours PRN Nausea and/or Vomiting  oxyCODONE    IR 5 milliGRAM(s) Oral every 6 hours PRN Moderate Pain (4 - 6)  oxyCODONE    IR 10 milliGRAM(s) Oral every 6 hours PRN Severe Pain (7 - 10)  zolpidem 5 milliGRAM(s) Oral at bedtime PRN Insomnia  zolpidem 5 milliGRAM(s) Oral at bedtime PRN Insomnia      Allergies    No Known Allergies      EXAM  Vital Signs Last 24 Hrs  T(C): 37.1 (07 Aug 2022 08:19), Max: 37.5 (06 Aug 2022 23:40)  T(F): 98.7 (07 Aug 2022 08:19), Max: 99.5 (06 Aug 2022 23:40)  HR: 76 (07 Aug 2022 08:19) (76 - 89)  BP: 131/83 (07 Aug 2022 08:19) (115/67 - 143/86)  BP(mean): 83 (06 Aug 2022 23:40) (83 - 83)  RR: 17 (07 Aug 2022 08:19) (14 - 17)  SpO2: 97% (07 Aug 2022 08:19) (95% - 97%)    Parameters below as of 07 Aug 2022 08:19  Patient On (Oxygen Delivery Method): room air    Awake and alert  No rash   No jaundice or icterus  RRR  Chest clear  Abd soft, still littel tender on the left side  Right knee in a brace.  No erythema.  Some swelling present      LABS:                        12.0   6.35  )-----------( 224      ( 07 Aug 2022 07:38 )             35.3     08-07    137  |  101  |  8   ----------------------------<  106<H>  3.7   |  25  |  0.76    Ca    8.8      07 Aug 2022 07:38  Phos  2.9     08-07  Mg     1.8     08-07        MICROBIOLOGY:    Culture - Surgical Swab (08.04.22 @ 23:39)    Gram Stain:   Rare Gram positive cocci in pairs  Moderate to Numerous White blood cells    -  Ampicillin: R >16 These ampicillin results predict results for amoxicillin    -  Ampicillin: R >16 These ampicillin results predict results for amoxicillin    -  Ampicillin: S <=8 These ampicillin results predict results for amoxicillin    -  Ampicillin/Sulbactam: S <=4/2 Enterobacter, Klebsiella aerogenes, Citrobacter, and Serratia may develop resistance during prolonged therapy (3-4 days)    -  Ampicillin/Sulbactam: I 16/8 Enterobacter, Klebsiella aerogenes, Citrobacter, and Serratia may develop resistance during prolonged therapy (3-4 days)    -  Ampicillin/Sulbactam: I 16/8 Enterobacter, Klebsiella aerogenes, Citrobacter, and Serratia may develop resistance during prolonged therapy (3-4 days)    -  Cefazolin: R >16 Enterobacter, Klebsiella aerogenes, Citrobacter, and Serratia may develop resistance during prolonged therapy (3-4 days)    -  Cefazolin: I 4 Enterobacter, Klebsiella aerogenes, Citrobacter, and Serratia may develop resistance during prolonged therapy (3-4 days)    -  Cefazolin: S <=2 Enterobacter, Klebsiella aerogenes, Citrobacter, and Serratia may develop resistance during prolonged therapy (3-4 days)    -  Cefepime: S <=2    -  Ceftriaxone: S <=1 Enterobacter, Klebsiella aerogenes, Citrobacter, and Serratia may develop resistance during prolonged therapy    -  Ceftriaxone: S <=1 Enterobacter, Klebsiella aerogenes, Citrobacter, and Serratia may develop resistance during prolonged therapy    -  Ceftriaxone: S <=1 Enterobacter, Klebsiella aerogenes, Citrobacter, and Serratia may develop resistance during prolonged therapy    -  Ciprofloxacin: I 0.5    -  Ciprofloxacin: S <=0.25    -  Ciprofloxacin: S <=0.25    -  Ertapenem: S <=0.5    -  Ertapenem: S <=0.5    -  Ertapenem: S <=0.5    -  Gentamicin: S <=2    -  Gentamicin: S <=2    -  Gentamicin: S <=2    -  Piperacillin/Tazobactam: S <=8    -  Piperacillin/Tazobactam: S <=8    -  Piperacillin/Tazobactam: S <=8    -  Tobramycin: S <=2    -  Tobramycin: S <=2    -  Tobramycin: S <=2    -  Trimethoprim/Sulfamethoxazole: S <=0.5/9.5    -  Trimethoprim/Sulfamethoxazole: S <=0.5/9.5    -  Trimethoprim/Sulfamethoxazole: R >2/38    Specimen Source: .Surgical Swab Intra-abdominal puss    Culture Results:   Rare Escherichia coli  Rare Escherichia coli #2  Rare Morganella morganii  Mixed Anaerobic Yolanda including  Rare Bacteroides thetaiotamcron group ... Beta lactamase positive  Rare Bacteroides vulgatus group ... Beta lactamase positive  Few Actinomyces turicensis  Rare Bacteroides fragilis ... Beta lactamase positive    Organism Identification: Escherichia coli  Escherichia coli  Morganella morganii    Organism: Escherichia coli    Organism: Escherichia coli    Organism: Morganella morganii    Method Type: ALIVIA    Method Type: ALIVIA    Method Type: ALIVIA

## 2022-08-07 NOTE — PROGRESS NOTE ADULT - SUBJECTIVE AND OBJECTIVE BOX
STATUS POST:  Sabas procedure 8/4     SUBJECTIVE: Patient seen and examined bedside by chief resident. Patient reports pain is well controlled this morning, denies cp/sob/n/v    heparin   Injectable 5000 Unit(s) SubCutaneous every 8 hours  piperacillin/tazobactam IVPB.. 3.375 Gram(s) IV Intermittent every 6 hours      Vital Signs Last 24 Hrs  T(C): 36.8 (07 Aug 2022 15:40), Max: 37.5 (06 Aug 2022 23:40)  T(F): 98.2 (07 Aug 2022 15:40), Max: 99.5 (06 Aug 2022 23:40)  HR: 78 (07 Aug 2022 15:40) (76 - 89)  BP: 130/80 (07 Aug 2022 15:40) (115/67 - 143/86)  BP(mean): 83 (06 Aug 2022 23:40) (83 - 83)  RR: 18 (07 Aug 2022 15:40) (17 - 18)  SpO2: 99% (07 Aug 2022 15:40) (95% - 99%)    Parameters below as of 07 Aug 2022 15:40  Patient On (Oxygen Delivery Method): room air      I&O's Detail    06 Aug 2022 07:01  -  07 Aug 2022 07:00  --------------------------------------------------------  IN:    IV PiggyBack: 100 mL    Lactated Ringers: 2210 mL  Total IN: 2310 mL    OUT:    Colostomy (mL): 0 mL    Voided (mL): 1450 mL  Total OUT: 1450 mL    Total NET: 860 mL      07 Aug 2022 07:01  -  07 Aug 2022 19:31  --------------------------------------------------------  IN:    IV PiggyBack: 100 mL    IV PiggyBack: 200 mL    Lactated Ringers: 1690 mL  Total IN: 1990 mL    OUT:    Colostomy (mL): 204 mL    Oral Fluid: 0 mL    Voided (mL): 1830 mL  Total OUT: 2034 mL    Total NET: -44 mL          General: NAD, resting comfortably in bed  C/V: NSR  Pulm: Nonlabored breathing, no respiratory distress  Abd: soft, appropriately tender, minimally distended. Ostomy with gas and stool present in bag.   Extrem: WWP, no edema, SCDs in place        LABS:                        12.0   6.35  )-----------( 224      ( 07 Aug 2022 07:38 )             35.3     08-07    137  |  101  |  8   ----------------------------<  106<H>  3.7   |  25  |  0.76    Ca    8.8      07 Aug 2022 07:38  Phos  2.9     08-07  Mg     1.8     08-07            RADIOLOGY & ADDITIONAL STUDIES:

## 2022-08-07 NOTE — PROGRESS NOTE ADULT - SUBJECTIVE AND OBJECTIVE BOX
Pt seen and examined   some pain  tolerating diet  complaining unable to sleep at current zolpidem dose  ostomy functioning    REVIEW OF SYSTEMS:  Constitutional: No fever, weight loss or fatigue  Cardiovascular: No chest pain, palpitations, dizziness or leg swelling  Gastrointestinal: No abdominal or epigastric pain. No nausea, vomiting; No diarrhea or constipation. No melena or hematochezia.  Skin: No itching, burning, rashes or lesions       MEDICATIONS:  MEDICATIONS  (STANDING):  benzocaine 15 mG/menthol 3.6 mG Lozenge 1 Lozenge Oral once  heparin   Injectable 5000 Unit(s) SubCutaneous every 8 hours  ketorolac   Injectable 15 milliGRAM(s) IV Push every 6 hours  lactated ringers. 1000 milliLiter(s) (130 mL/Hr) IV Continuous <Continuous>  piperacillin/tazobactam IVPB.. 3.375 Gram(s) IV Intermittent every 6 hours    MEDICATIONS  (PRN):  acetaminophen     Tablet .. 650 milliGRAM(s) Oral every 6 hours PRN mild pain (1-3)  melatonin 5 milliGRAM(s) Oral at bedtime PRN Sleep  ondansetron Injectable 4 milliGRAM(s) IV Push every 6 hours PRN Nausea and/or Vomiting  oxyCODONE    IR 5 milliGRAM(s) Oral every 6 hours PRN Moderate Pain (4 - 6)  oxyCODONE    IR 10 milliGRAM(s) Oral every 6 hours PRN Severe Pain (7 - 10)  zolpidem 5 milliGRAM(s) Oral at bedtime PRN Insomnia  zolpidem 5 milliGRAM(s) Oral at bedtime PRN Insomnia  zolpidem 10 milliGRAM(s) Oral at bedtime PRN Insomnia      Allergies    No Known Allergies    Intolerances        Vital Signs Last 24 Hrs  T(C): 37.1 (07 Aug 2022 08:19), Max: 37.5 (06 Aug 2022 23:40)  T(F): 98.7 (07 Aug 2022 08:19), Max: 99.5 (06 Aug 2022 23:40)  HR: 76 (07 Aug 2022 08:19) (76 - 89)  BP: 131/83 (07 Aug 2022 08:19) (115/67 - 143/86)  BP(mean): 83 (06 Aug 2022 23:40) (83 - 83)  RR: 17 (07 Aug 2022 08:19) (14 - 17)  SpO2: 97% (07 Aug 2022 08:19) (95% - 97%)    Parameters below as of 07 Aug 2022 08:19  Patient On (Oxygen Delivery Method): room air        08-06 @ 07:01  -  08-07 @ 07:00  --------------------------------------------------------  IN: 2310 mL / OUT: 1450 mL / NET: 860 mL    08-07 @ 07:01 - 08-07 @ 13:30  --------------------------------------------------------  IN: 1240 mL / OUT: 777 mL / NET: 463 mL        PHYSICAL EXAM:    General: Well developed; well nourished; in no acute distress  HEENT: MMM, conjunctiva and sclera clear  Lungs: clear  Heart: regular  Gastrointestinal: Soft non-tender non-distended; Normal bowel sounds; ostomy functioning  Skin: Warm and dry. No obvious rash    LABS:      CBC Full  -  ( 07 Aug 2022 07:38 )  WBC Count : 6.35 K/uL  RBC Count : 3.84 M/uL  Hemoglobin : 12.0 g/dL  Hematocrit : 35.3 %  Platelet Count - Automated : 224 K/uL  Mean Cell Volume : 91.9 fl  Mean Cell Hemoglobin : 31.3 pg  Mean Cell Hemoglobin Concentration : 34.0 gm/dL  Auto Neutrophil # : x  Auto Lymphocyte # : x  Auto Monocyte # : x  Auto Eosinophil # : x  Auto Basophil # : x  Auto Neutrophil % : x  Auto Lymphocyte % : x  Auto Monocyte % : x  Auto Eosinophil % : x  Auto Basophil % : x    08-07    137  |  101  |  8   ----------------------------<  106<H>  3.7   |  25  |  0.76    Ca    8.8      07 Aug 2022 07:38  Phos  2.9     08-07  Mg     1.8     08-07        Culture - Surgical Swab (08.04.22 @ 23:39)    Gram Stain:   Rare Gram positive cocci in pairs  Moderate to Numerous White blood cells    -  Ampicillin: R >16 These ampicillin results predict results for amoxicillin    -  Ampicillin: R >16 These ampicillin results predict results for amoxicillin    -  Ampicillin: S <=8 These ampicillin results predict results for amoxicillin    -  Ampicillin/Sulbactam: S <=4/2 Enterobacter, Klebsiella aerogenes, Citrobacter, and Serratia may develop resistance during prolonged therapy (3-4 days)    -  Ampicillin/Sulbactam: I 16/8 Enterobacter, Klebsiella aerogenes, Citrobacter, and Serratia may develop resistance during prolonged therapy (3-4 days)    -  Ampicillin/Sulbactam: I 16/8 Enterobacter, Klebsiella aerogenes, Citrobacter, and Serratia may develop resistance during prolonged therapy (3-4 days)    -  Cefazolin: R >16 Enterobacter, Klebsiella aerogenes, Citrobacter, and Serratia may develop resistance during prolonged therapy (3-4 days)    -  Cefazolin: I 4 Enterobacter, Klebsiella aerogenes, Citrobacter, and Serratia may develop resistance during prolonged therapy (3-4 days)    -  Cefazolin: S <=2 Enterobacter, Klebsiella aerogenes, Citrobacter, and Serratia may develop resistance during prolonged therapy (3-4 days)    -  Cefepime: S <=2    -  Ceftriaxone: S <=1 Enterobacter, Klebsiella aerogenes, Citrobacter, and Serratia may develop resistance during prolonged therapy    -  Ceftriaxone: S <=1 Enterobacter, Klebsiella aerogenes, Citrobacter, and Serratia may develop resistance during prolonged therapy    -  Ceftriaxone: S <=1 Enterobacter, Klebsiella aerogenes, Citrobacter, and Serratia may develop resistance during prolonged therapy    -  Ciprofloxacin: I 0.5    -  Ciprofloxacin: S <=0.25    -  Ciprofloxacin: S <=0.25    -  Ertapenem: S <=0.5    -  Ertapenem: S <=0.5    -  Ertapenem: S <=0.5    -  Gentamicin: S <=2    -  Gentamicin: S <=2    -  Gentamicin: S <=2    -  Piperacillin/Tazobactam: S <=8    -  Piperacillin/Tazobactam: S <=8    -  Piperacillin/Tazobactam: S <=8    -  Tobramycin: S <=2    -  Tobramycin: S <=2    -  Tobramycin: S <=2    -  Trimethoprim/Sulfamethoxazole: S <=0.5/9.5    -  Trimethoprim/Sulfamethoxazole: S <=0.5/9.5    -  Trimethoprim/Sulfamethoxazole: R >2/38    Specimen Source: .Surgical Swab Intra-abdominal puss    Culture Results:   Rare Escherichia coli  Rare Escherichia coli #2  Rare Morganella morganii  Mixed Anaerobic Yolanda including  Rare Bacteroides thetaiotamcron group ... Beta lactamase positive  Rare Bacteroides vulgatus group ... Beta lactamase positive  Few Actinomyces turicensis  Rare Bacteroides fragilis ... Beta lactamase positive    Organism Identification: Escherichia coli  Escherichia coli  Morganella morganii    Organism: Escherichia coli    Organism: Escherichia coli    Organism: Morganella morganii    Method Type: ALIVIA    Method Type: ALIVIA    Method Type: ALIVIA                    RADIOLOGY & ADDITIONAL STUDIES (The following images were personally reviewed):

## 2022-08-07 NOTE — PROGRESS NOTE ADULT - ASSESSMENT
42 M PMH of uncomplicated diverticulitis x3 (last episode 6 months ago) presents with acute complicated diverticulitis with focal perforation, on IV antibiotics now s/p Hartmans procedure on 8/4.    - CLD/IVF   - Abx: zosyn; s/p CTX 2g, MTD  - Pain and Nausea control  - SCD/SQH  - ostomy consult  - IS/OOB  - AM labs

## 2022-08-08 LAB
ANION GAP SERPL CALC-SCNC: 10 MMOL/L — SIGNIFICANT CHANGE UP (ref 5–17)
BUN SERPL-MCNC: 7 MG/DL — SIGNIFICANT CHANGE UP (ref 7–23)
CALCIUM SERPL-MCNC: 8.6 MG/DL — SIGNIFICANT CHANGE UP (ref 8.4–10.5)
CHLORIDE SERPL-SCNC: 102 MMOL/L — SIGNIFICANT CHANGE UP (ref 96–108)
CO2 SERPL-SCNC: 26 MMOL/L — SIGNIFICANT CHANGE UP (ref 22–31)
CREAT SERPL-MCNC: 0.77 MG/DL — SIGNIFICANT CHANGE UP (ref 0.5–1.3)
EGFR: 115 ML/MIN/1.73M2 — SIGNIFICANT CHANGE UP
GLUCOSE SERPL-MCNC: 132 MG/DL — HIGH (ref 70–99)
HCT VFR BLD CALC: 34.8 % — LOW (ref 39–50)
HGB BLD-MCNC: 11.9 G/DL — LOW (ref 13–17)
MAGNESIUM SERPL-MCNC: 2 MG/DL — SIGNIFICANT CHANGE UP (ref 1.6–2.6)
MCHC RBC-ENTMCNC: 31.2 PG — SIGNIFICANT CHANGE UP (ref 27–34)
MCHC RBC-ENTMCNC: 34.2 GM/DL — SIGNIFICANT CHANGE UP (ref 32–36)
MCV RBC AUTO: 91.1 FL — SIGNIFICANT CHANGE UP (ref 80–100)
NRBC # BLD: 0 /100 WBCS — SIGNIFICANT CHANGE UP (ref 0–0)
PHOSPHATE SERPL-MCNC: 3.7 MG/DL — SIGNIFICANT CHANGE UP (ref 2.5–4.5)
PLATELET # BLD AUTO: 292 K/UL — SIGNIFICANT CHANGE UP (ref 150–400)
POTASSIUM SERPL-MCNC: 4 MMOL/L — SIGNIFICANT CHANGE UP (ref 3.5–5.3)
POTASSIUM SERPL-SCNC: 4 MMOL/L — SIGNIFICANT CHANGE UP (ref 3.5–5.3)
RBC # BLD: 3.82 M/UL — LOW (ref 4.2–5.8)
RBC # FLD: 11.9 % — SIGNIFICANT CHANGE UP (ref 10.3–14.5)
SODIUM SERPL-SCNC: 138 MMOL/L — SIGNIFICANT CHANGE UP (ref 135–145)
WBC # BLD: 7.18 K/UL — SIGNIFICANT CHANGE UP (ref 3.8–10.5)
WBC # FLD AUTO: 7.18 K/UL — SIGNIFICANT CHANGE UP (ref 3.8–10.5)

## 2022-08-08 PROCEDURE — 74018 RADEX ABDOMEN 1 VIEW: CPT | Mod: 26

## 2022-08-08 RX ORDER — PANTOPRAZOLE SODIUM 20 MG/1
40 TABLET, DELAYED RELEASE ORAL
Refills: 0 | Status: DISCONTINUED | OUTPATIENT
Start: 2022-08-08 | End: 2022-08-08

## 2022-08-08 RX ORDER — SODIUM CHLORIDE 9 MG/ML
1000 INJECTION, SOLUTION INTRAVENOUS
Refills: 0 | Status: DISCONTINUED | OUTPATIENT
Start: 2022-08-08 | End: 2022-08-13

## 2022-08-08 RX ORDER — FAMOTIDINE 10 MG/ML
20 INJECTION INTRAVENOUS ONCE
Refills: 0 | Status: COMPLETED | OUTPATIENT
Start: 2022-08-08 | End: 2022-08-08

## 2022-08-08 RX ORDER — HYDROMORPHONE HYDROCHLORIDE 2 MG/ML
0.5 INJECTION INTRAMUSCULAR; INTRAVENOUS; SUBCUTANEOUS EVERY 6 HOURS
Refills: 0 | Status: DISCONTINUED | OUTPATIENT
Start: 2022-08-08 | End: 2022-08-10

## 2022-08-08 RX ORDER — PANTOPRAZOLE SODIUM 20 MG/1
40 TABLET, DELAYED RELEASE ORAL ONCE
Refills: 0 | Status: COMPLETED | OUTPATIENT
Start: 2022-08-08 | End: 2022-08-08

## 2022-08-08 RX ORDER — PANTOPRAZOLE SODIUM 20 MG/1
40 TABLET, DELAYED RELEASE ORAL ONCE
Refills: 0 | Status: DISCONTINUED | OUTPATIENT
Start: 2022-08-08 | End: 2022-08-08

## 2022-08-08 RX ORDER — PANTOPRAZOLE SODIUM 20 MG/1
40 TABLET, DELAYED RELEASE ORAL DAILY
Refills: 0 | Status: DISCONTINUED | OUTPATIENT
Start: 2022-08-09 | End: 2022-08-15

## 2022-08-08 RX ADMIN — PIPERACILLIN AND TAZOBACTAM 200 GRAM(S): 4; .5 INJECTION, POWDER, LYOPHILIZED, FOR SOLUTION INTRAVENOUS at 17:11

## 2022-08-08 RX ADMIN — ONDANSETRON 4 MILLIGRAM(S): 8 TABLET, FILM COATED ORAL at 04:06

## 2022-08-08 RX ADMIN — Medication 15 MILLIGRAM(S): at 06:00

## 2022-08-08 RX ADMIN — Medication 15 MILLIGRAM(S): at 05:30

## 2022-08-08 RX ADMIN — HEPARIN SODIUM 5000 UNIT(S): 5000 INJECTION INTRAVENOUS; SUBCUTANEOUS at 14:15

## 2022-08-08 RX ADMIN — SODIUM CHLORIDE 130 MILLILITER(S): 9 INJECTION, SOLUTION INTRAVENOUS at 05:29

## 2022-08-08 RX ADMIN — FAMOTIDINE 20 MILLIGRAM(S): 10 INJECTION INTRAVENOUS at 12:56

## 2022-08-08 RX ADMIN — ZOLPIDEM TARTRATE 5 MILLIGRAM(S): 10 TABLET ORAL at 00:14

## 2022-08-08 RX ADMIN — PIPERACILLIN AND TAZOBACTAM 200 GRAM(S): 4; .5 INJECTION, POWDER, LYOPHILIZED, FOR SOLUTION INTRAVENOUS at 11:14

## 2022-08-08 RX ADMIN — Medication 15 MILLIGRAM(S): at 22:48

## 2022-08-08 RX ADMIN — HEPARIN SODIUM 5000 UNIT(S): 5000 INJECTION INTRAVENOUS; SUBCUTANEOUS at 05:30

## 2022-08-08 RX ADMIN — Medication 15 MILLIGRAM(S): at 23:20

## 2022-08-08 RX ADMIN — Medication 15 MILLIGRAM(S): at 11:14

## 2022-08-08 RX ADMIN — ONDANSETRON 4 MILLIGRAM(S): 8 TABLET, FILM COATED ORAL at 15:54

## 2022-08-08 RX ADMIN — SODIUM CHLORIDE 120 MILLILITER(S): 9 INJECTION, SOLUTION INTRAVENOUS at 16:54

## 2022-08-08 RX ADMIN — PANTOPRAZOLE SODIUM 40 MILLIGRAM(S): 20 TABLET, DELAYED RELEASE ORAL at 15:53

## 2022-08-08 RX ADMIN — PIPERACILLIN AND TAZOBACTAM 200 GRAM(S): 4; .5 INJECTION, POWDER, LYOPHILIZED, FOR SOLUTION INTRAVENOUS at 22:49

## 2022-08-08 RX ADMIN — PIPERACILLIN AND TAZOBACTAM 200 GRAM(S): 4; .5 INJECTION, POWDER, LYOPHILIZED, FOR SOLUTION INTRAVENOUS at 05:30

## 2022-08-08 RX ADMIN — Medication 15 MILLIGRAM(S): at 17:11

## 2022-08-08 RX ADMIN — HEPARIN SODIUM 5000 UNIT(S): 5000 INJECTION INTRAVENOUS; SUBCUTANEOUS at 22:49

## 2022-08-08 RX ADMIN — SODIUM CHLORIDE 130 MILLILITER(S): 9 INJECTION, SOLUTION INTRAVENOUS at 00:15

## 2022-08-08 RX ADMIN — PANTOPRAZOLE SODIUM 40 MILLIGRAM(S): 20 TABLET, DELAYED RELEASE ORAL at 04:19

## 2022-08-08 RX ADMIN — Medication 15 MILLIGRAM(S): at 17:19

## 2022-08-08 RX ADMIN — Medication 15 MILLIGRAM(S): at 11:19

## 2022-08-08 NOTE — PHYSICAL THERAPY INITIAL EVALUATION ADULT - PERTINENT HX OF CURRENT PROBLEM, REHAB EVAL
41yo male with PMH of diverticulitis who presents to emergency with 1 day history of abdominal pain. Patient has had 3 previous episodes of uncomplicated diverticulitis managed outpatient with antibiotics. Presents today with acute onset intense, sharp, constant 10/10 LLQ abdominal pain, non radiating. Pain started at 4am this AM and awoke him from sleep. Pain feels more intense than previous episodes of diverticulitis. Please refer to H&P on [place] for remaining.

## 2022-08-08 NOTE — PHYSICAL THERAPY INITIAL EVALUATION ADULT - GAIT DEVIATIONS NOTED, PT EVAL
fairly steady gait, no LOB/knee buckling noted; increased time required with turning/decreased mariza/decreased velocity of limb motion/decreased step length/decreased weight-shifting ability

## 2022-08-08 NOTE — PHYSICAL THERAPY INITIAL EVALUATION ADULT - MODALITIES TREATMENT COMMENTS
Reviewed/educated patient on importance of active-assisted heel-slides with towel pull, seated heel-slides with towel on floor, quad sets, and towel beneath ankle to promote improved (R)knee ROM; patient verbalized understanding

## 2022-08-08 NOTE — PROGRESS NOTE ADULT - SUBJECTIVE AND OBJECTIVE BOX
STATUS POST:  s/p Hartmans procedure 8/4    POST OPERATIVE DAY #: 4    SUBJECTIVE: Pt seen and examined at bedside this am by surgery team. This morning, he complains of nausea with reflux symptoms, no vomiting. Currently on CLD. Ostomy functioning. Pain well controlled. Denies f/n/v/cp/sob.    Vital Signs Last 24 Hrs  T(C): 36.8 (08 Aug 2022 05:10), Max: 37.2 (07 Aug 2022 20:29)  T(F): 98.3 (08 Aug 2022 05:10), Max: 98.9 (07 Aug 2022 20:29)  HR: 73 (08 Aug 2022 05:10) (73 - 85)  BP: 134/89 (08 Aug 2022 05:10) (127/83 - 141/91)  BP(mean): --  RR: 17 (08 Aug 2022 05:10) (17 - 18)  SpO2: 96% (08 Aug 2022 05:10) (95% - 99%)    Parameters below as of 08 Aug 2022 05:10  Patient On (Oxygen Delivery Method): room air        PHYSICAL EXAM:  Constitutional: Awake, alert, NAD  Respiratory: non labored breathing, no respiratory distress  Cardiovascular: NSR, RRR  Gastrointestinal: soft, appropriately tender, distension unchanged from prior, no rebound, no guarding, ostomy functioning, dressings c/d/i   Extremities: (-) edema, wwp                 I&O's Detail    07 Aug 2022 07:01  -  08 Aug 2022 07:00  --------------------------------------------------------  IN:    IV PiggyBack: 100 mL    IV PiggyBack: 400 mL    Lactated Ringers: 2860 mL  Total IN: 3360 mL    OUT:    Colostomy (mL): 229 mL    Oral Fluid: 0 mL    Voided (mL): 2480 mL  Total OUT: 2709 mL    Total NET: 651 mL          LABS:                        11.9   7.18  )-----------( 292      ( 08 Aug 2022 07:01 )             34.8     08-08    138  |  102  |  7   ----------------------------<  132<H>  4.0   |  26  |  0.77    Ca    8.6      08 Aug 2022 07:01  Phos  3.7     08-08  Mg     2.0     08-08            RADIOLOGY & ADDITIONAL STUDIES:

## 2022-08-08 NOTE — PHYSICAL THERAPY INITIAL EVALUATION ADULT - IMPAIRED TRANSFERS: SIT/STAND, REHAB EVAL
(R)knee ROM decreased/impaired balance/decreased flexibility/impaired postural control/decreased strength

## 2022-08-08 NOTE — DISCHARGE NOTE PROVIDER - NSDCCPCAREPLAN_GEN_ALL_CORE_FT
PRINCIPAL DISCHARGE DIAGNOSIS  Diagnosis: Diverticulitis of colon with perforation  Assessment and Plan of Treatment:

## 2022-08-08 NOTE — PHYSICAL THERAPY INITIAL EVALUATION ADULT - GENERAL OBSERVATIONS, REHAB EVAL
PT IE completed. Chart reviewed. Patient without complaints of pain at rest, agreeable to PT. Patient received semi-supine, +(L)IV hep lock, +ostomy, mother at bedside, RN Sridevi cleared patient for treatment.

## 2022-08-08 NOTE — PHYSICAL THERAPY INITIAL EVALUATION ADULT - THERAPY FREQUENCY, PT EVAL
1-2 more PT sessions at [facility]; Patient educated on frequency of inpatient physical therapy at [facility], patient verbalized understanding.

## 2022-08-08 NOTE — PROGRESS NOTE ADULT - SUBJECTIVE AND OBJECTIVE BOX
INTERVAL HPI/OVERNIGHT EVENTS:    ANTIBIOTICS/RELEVANT:    MEDICATIONS  (STANDING):  heparin   Injectable 5000 Unit(s) SubCutaneous every 8 hours  ketorolac   Injectable 15 milliGRAM(s) IV Push every 6 hours  pantoprazole    Tablet 40 milliGRAM(s) Oral before breakfast  piperacillin/tazobactam IVPB.. 3.375 Gram(s) IV Intermittent every 6 hours    MEDICATIONS  (PRN):  acetaminophen     Tablet .. 650 milliGRAM(s) Oral every 6 hours PRN mild pain (1-3)  melatonin 5 milliGRAM(s) Oral at bedtime PRN Sleep  ondansetron Injectable 4 milliGRAM(s) IV Push every 6 hours PRN Nausea and/or Vomiting  oxyCODONE    IR 5 milliGRAM(s) Oral every 6 hours PRN Moderate Pain (4 - 6)  oxyCODONE    IR 10 milliGRAM(s) Oral every 6 hours PRN Severe Pain (7 - 10)  zolpidem 5 milliGRAM(s) Oral at bedtime PRN Insomnia  zolpidem 5 milliGRAM(s) Oral at bedtime PRN Insomnia      Allergies    No Known Allergies    Intolerances        Vital Signs Last 24 Hrs  T(C): 36.9 (08 Aug 2022 13:38), Max: 37.2 (07 Aug 2022 20:29)  T(F): 98.5 (08 Aug 2022 13:38), Max: 98.9 (07 Aug 2022 20:29)  HR: 67 (08 Aug 2022 13:38) (57 - 85)  BP: 143/86 (08 Aug 2022 13:38) (127/83 - 143/86)  BP(mean): --  RR: 18 (08 Aug 2022 13:38) (17 - 18)  SpO2: 95% (08 Aug 2022 13:38) (95% - 97%)    Parameters below as of 08 Aug 2022 13:38  Patient On (Oxygen Delivery Method): room air          LABS:                        11.9   7.18  )-----------( 292      ( 08 Aug 2022 07:01 )             34.8     08-08    138  |  102  |  7   ----------------------------<  132<H>  4.0   |  26  |  0.77    Ca    8.6      08 Aug 2022 07:01  Phos  3.7     08-08  Mg     2.0     08-08            MICROBIOLOGY:    RADIOLOGY & ADDITIONAL STUDIES: INTERVAL HPI/OVERNIGHT EVENTS:        MEDICATIONS  (STANDING):  heparin   Injectable 5000 Unit(s) SubCutaneous every 8 hours  ketorolac   Injectable 15 milliGRAM(s) IV Push every 6 hours  pantoprazole    Tablet 40 milliGRAM(s) Oral before breakfast  piperacillin/tazobactam IVPB.. 3.375 Gram(s) IV Intermittent every 6 hours    MEDICATIONS  (PRN):  acetaminophen     Tablet .. 650 milliGRAM(s) Oral every 6 hours PRN mild pain (1-3)  melatonin 5 milliGRAM(s) Oral at bedtime PRN Sleep  ondansetron Injectable 4 milliGRAM(s) IV Push every 6 hours PRN Nausea and/or Vomiting  oxyCODONE    IR 5 milliGRAM(s) Oral every 6 hours PRN Moderate Pain (4 - 6)  oxyCODONE    IR 10 milliGRAM(s) Oral every 6 hours PRN Severe Pain (7 - 10)  zolpidem 5 milliGRAM(s) Oral at bedtime PRN Insomnia  zolpidem 5 milliGRAM(s) Oral at bedtime PRN Insomnia      Allergies    No Known Allergies    Intolerances        Vital Signs Last 24 Hrs  T(C): 36.9 (08 Aug 2022 13:38), Max: 37.2 (07 Aug 2022 20:29)  T(F): 98.5 (08 Aug 2022 13:38), Max: 98.9 (07 Aug 2022 20:29)  HR: 67 (08 Aug 2022 13:38) (57 - 85)  BP: 143/86 (08 Aug 2022 13:38) (127/83 - 143/86)  BP(mean): --  RR: 18 (08 Aug 2022 13:38) (17 - 18)  SpO2: 95% (08 Aug 2022 13:38) (95% - 97%)    Parameters below as of 08 Aug 2022 13:38  Patient On (Oxygen Delivery Method): room air          LABS:                        11.9   7.18  )-----------( 292      ( 08 Aug 2022 07:01 )             34.8     08-08    138  |  102  |  7   ----------------------------<  132<H>  4.0   |  26  |  0.77    Ca    8.6      08 Aug 2022 07:01  Phos  3.7     08-08  Mg     2.0     08-08            MICROBIOLOGY:    RADIOLOGY & ADDITIONAL STUDIES: INTERVAL HPI/OVERNIGHT EVENTS:    Severe nausea and abdominal distension and discomfort    MEDICATIONS  (STANDING):  heparin   Injectable 5000 Unit(s) SubCutaneous every 8 hours  ketorolac   Injectable 15 milliGRAM(s) IV Push every 6 hours  pantoprazole    Tablet 40 milliGRAM(s) Oral before breakfast  piperacillin/tazobactam IVPB.. 3.375 Gram(s) IV Intermittent every 6 hours    MEDICATIONS  (PRN):  acetaminophen     Tablet .. 650 milliGRAM(s) Oral every 6 hours PRN mild pain (1-3)  melatonin 5 milliGRAM(s) Oral at bedtime PRN Sleep  ondansetron Injectable 4 milliGRAM(s) IV Push every 6 hours PRN Nausea and/or Vomiting  oxyCODONE    IR 5 milliGRAM(s) Oral every 6 hours PRN Moderate Pain (4 - 6)  oxyCODONE    IR 10 milliGRAM(s) Oral every 6 hours PRN Severe Pain (7 - 10)  zolpidem 5 milliGRAM(s) Oral at bedtime PRN Insomnia  zolpidem 5 milliGRAM(s) Oral at bedtime PRN Insomnia      Allergies    No Known Allergies    EXAM  Vital Signs Last 24 Hrs  T(C): 36.9 (08 Aug 2022 13:38), Max: 37.2 (07 Aug 2022 20:29)  T(F): 98.5 (08 Aug 2022 13:38), Max: 98.9 (07 Aug 2022 20:29)  HR: 67 (08 Aug 2022 13:38) (57 - 85)  BP: 143/86 (08 Aug 2022 13:38) (127/83 - 143/86)  BP(mean): --  RR: 18 (08 Aug 2022 13:38) (17 - 18)  SpO2: 95% (08 Aug 2022 13:38) (95% - 97%)    Parameters below as of 08 Aug 2022 13:38  Patient On (Oxygen Delivery Method): room air  Awake but appearing very uncomfortable and diaphoretic  Responding appropriately  RR  Chest CTA  Abd distended and more firm and tender to palpation  LEs no edema  Right knee in the brace         LABS:                        11.9   7.18  )-----------( 292      ( 08 Aug 2022 07:01 )             34.8     08-08    138  |  102  |  7   ----------------------------<  132<H>  4.0   |  26  |  0.77    Ca    8.6      08 Aug 2022 07:01  Phos  3.7     08-08  Mg     2.0     08-08        MICROBIOLOGY:    Culture - Surgical Swab (08.04.22 @ 23:39)    Gram Stain:   Rare Gram positive cocci in pairs  Moderate to Numerous White blood cells    -  Ampicillin: R >16 These ampicillin results predict results for amoxicillin    -  Ampicillin: R >16 These ampicillin results predict results for amoxicillin    -  Ampicillin: S <=8 These ampicillin results predict results for amoxicillin    -  Ampicillin/Sulbactam: S <=4/2 Enterobacter, Klebsiella aerogenes, Citrobacter, and Serratia may develop resistance during prolonged therapy (3-4 days)    -  Ampicillin/Sulbactam: I 16/8 Enterobacter, Klebsiella aerogenes, Citrobacter, and Serratia may develop resistance during prolonged therapy (3-4 days)    -  Ampicillin/Sulbactam: I 16/8 Enterobacter, Klebsiella aerogenes, Citrobacter, and Serratia may develop resistance during prolonged therapy (3-4 days)    -  Cefazolin: R >16 Enterobacter, Klebsiella aerogenes, Citrobacter, and Serratia may develop resistance during prolonged therapy (3-4 days)    -  Cefazolin: I 4 Enterobacter, Klebsiella aerogenes, Citrobacter, and Serratia may develop resistance during prolonged therapy (3-4 days)    -  Cefazolin: S <=2 Enterobacter, Klebsiella aerogenes, Citrobacter, and Serratia may develop resistance during prolonged therapy (3-4 days)    -  Cefepime: S <=2    -  Ceftriaxone: S <=1 Enterobacter, Klebsiella aerogenes, Citrobacter, and Serratia may develop resistance during prolonged therapy    -  Ceftriaxone: S <=1 Enterobacter, Klebsiella aerogenes, Citrobacter, and Serratia may develop resistance during prolonged therapy    -  Ceftriaxone: S <=1 Enterobacter, Klebsiella aerogenes, Citrobacter, and Serratia may develop resistance during prolonged therapy    -  Ciprofloxacin: I 0.5    -  Ciprofloxacin: S <=0.25    -  Ciprofloxacin: S <=0.25    -  Ertapenem: S <=0.5    -  Ertapenem: S <=0.5    -  Ertapenem: S <=0.5    -  Gentamicin: S <=2    -  Gentamicin: S <=2    -  Gentamicin: S <=2    -  Piperacillin/Tazobactam: S <=8    -  Piperacillin/Tazobactam: S <=8    -  Piperacillin/Tazobactam: S <=8    -  Tobramycin: S <=2    -  Tobramycin: S <=2    -  Tobramycin: S <=2    -  Trimethoprim/Sulfamethoxazole: S <=0.5/9.5    -  Trimethoprim/Sulfamethoxazole: S <=0.5/9.5    -  Trimethoprim/Sulfamethoxazole: R >2/38    Specimen Source: .Surgical Swab Intra-abdominal puss    Culture Results:   Rare Escherichia coli  Rare Escherichia coli #2  Rare Morganella morganii  Mixed Anaerobic Yolanda including  Rare Bacteroides thetaiotamcron group ... Beta lactamase positive  Rare Bacteroides vulgatus group ... Beta lactamase positive  Few Actinomyces turicensis  Rare Bacteroides fragilis ... Beta lactamase positive    Organism Identification: Escherichia coli  Escherichia coli  Morganella morganii    Organism: Escherichia coli    Organism: Escherichia coli    Organism: Morganella morganii    Method Type: ALIVIA    Method Type: ALIVIA    Method Type: ALIVIA        RADIOLOGY & ADDITIONAL STUDIES:    Xray Abdomen 1 View PORTABLE -Urgent (Xray Abdomen 1 View PORTABLE -Urgent .) (08.08.22 @ 18:45) >      INTERPRETATION:  ABDOMINAL RADIOGRAPH    History: Postoperative day 4 from Robbins's procedure, new onset nausea    Prior studies: CT abdomen pelvis dated 8/4/2022 partial comparison to   chest radiographs dated 8/5/2022    Findings: Portablefrontal view of the abdomen demonstrates interval   development of dilated loops of small bowel measuring up to 5.6 cm within   the visible abdomen. Portable supine technique limits evaluation for   air-fluid levels. Diaphragms are not included on this image. Small-volume   oral contrast remains within the margin to. Surgical staples are seen   coursing right of midline. Postsurgical colostomy opening left lower   quadrant. The bones are unremarkable.    Impression: Dilated loops of small bowel with oral contrast retained in   the large intestine, likely represents ileus.

## 2022-08-08 NOTE — PHYSICAL THERAPY INITIAL EVALUATION ADULT - PHYSICAL ASSIST/NONPHYSICAL ASSIST: SUPINE/SIT, REHAB EVAL
able to bring B/L LEs to EOB with VCs; *increased assist for trunk mobility/verbal cues/nonverbal cues (demo/gestures)/1 person assist

## 2022-08-08 NOTE — DISCHARGE NOTE PROVIDER - NSDCMRMEDTOKEN_GEN_ALL_CORE_FT
Propecia 1 mg oral tablet: 1 tab(s) orally once a day   Adapt Barrier Rings 2&quot; #8805: Apply topically to affected area once a day   Adapt ostomy belt (large) #7299: Apply topically to affected area once a day   Adapt stoma powder #7906: Apply topically to affected area once a day   [place] Drainable Pouches 2 3/4&quot; #48706: Apply topically to affected area once a day   [place]ica Skin Barriers Flat 2 3/4&quot; #03685: Apply topically to affected area once a day   Propecia 1 mg oral tablet: 1 tab(s) orally once a day  Skin prep #7917: Apply topically to affected area once a day    Adapt Barrier Rings 2&quot; #8805: Apply topically to affected area once a day   Adapt ostomy belt (large) #7299: Apply topically to affected area once a day   Adapt stoma powder #7906: Apply topically to affected area once a day   Colace 100 mg oral capsule: 1 cap(s) orally 2 times a day, As Needed -for constipation   [place]ica Drainable Pouches 2 3/4&quot; #60153: Apply topically to affected area once a day   [place]ica Skin Barriers Flat 2 3/4&quot; #53564: Apply topically to affected area once a day   oxyCODONE 5 mg oral tablet: 1 tab(s) orally every 6 hours, As Needed -for severe pain MDD:4 tabs  Propecia 1 mg oral tablet: 1 tab(s) orally once a day  Skin prep #7917: Apply topically to affected area once a day

## 2022-08-08 NOTE — DISCHARGE NOTE PROVIDER - NSDCFUADDAPPT_GEN_ALL_CORE_FT
Please follow up with Dr. Deluca next week. Call his office to schedule an appointment: [phone]. Please follow up with Dr. Deluca next week. Call his office to schedule an appointment: [phone]. Please follow-up with Dr. Deluca about obtaining an outpatient contrast-enhanced MRI for 5 cm splenic mass found on CT on 8/4/2022.

## 2022-08-08 NOTE — PHYSICAL THERAPY INITIAL EVALUATION ADULT - IMPAIRMENTS CONTRIBUTING TO GAIT DEVIATIONS, PT EVAL
(R)knee ROM decreased/impaired balance/decreased flexibility/pain/impaired postural control/decreased strength

## 2022-08-08 NOTE — DISCHARGE NOTE PROVIDER - NSDCFUADDINST_GEN_ALL_CORE_FT
Please follow up with Dr. Deluca next week. Call his office to schedule an appointment: [phone].    MRI: Please be sure to obtain an outpatient contrast-enhanced MRI for 5 cm splenic mass found on CT 8/4/2022.     Pain control:  Please take 2 tabs of extra strength Tylenol every 6 hours as needed for pain. DO NOT exceed 4000 mg per day. You have been prescribed oxycodone for pain not controlled by Tylenol. Take 1 tab every 6 hours as needed for severe pain INSTEAD of Tylenol. Please do not exceed 4 tabs per day. Take prescribed stool softener (colace) to prevent constipation caused by oxycodone.    Keep well hydrated. Replace fluid loss from ostomy daily. Avoid only drinking plain water. Include Gatorade and/or other vitamin drinks to replace fluid. Try to maintain ostomy output between 1000mL to 1500mL per day. If Ostomy output >1 liter, take 4mg of Imodium, repeat 2mg with each episode of loose stool. Do not exceed 16mg/24 hours.    General Discharge Instructions:  Please resume all regular home medications unless specifically advised not to take a particular medication. Also, please take any new medications as prescribed.  Please get plenty of rest, continue to ambulate several times per day, and drink adequate amounts of fluids. Avoid lifting weights greater than 5-10 lbs until you follow-up with your surgeon, who will instruct you further regarding activity restrictions.  Avoid driving or operating heavy machinery while taking pain medications.  Please follow-up with your surgeon and Primary Care Provider (PCP) as advised.  Incision Care:  *Please call your doctor or nurse practitioner if you have increased pain, swelling, redness, or drainage from the incision site.  *Avoid swimming and baths until your follow-up appointment.  *You may shower, and wash surgical incisions with a mild soap and warm water. Gently pat the area dry.  *If you have staples, they will be removed at your follow-up appointment.  *If you have steri-strips, they will fall off on their own. Please remove any remaining strips 7-10 days after surgery.    Please call your doctor if you experience any of the following:  -New chest pain, pressure, squeezing or tightness.  -New or worsening cough, shortness of breath or wheeze.  -If you are vomiting and cannot keep down medications.  -You see blood or dark black material when vomiting or moving your bowels.  -You experience burning when you urinate, blood in your urine, or experience discharge.  -Your pain is getting worse changes location or moves to your chest or back.  -You have shaking, chills, or fever greater than 100.5F or 38C.  -Any changes in your symptoms that concern you.    Please continue a LOW-FIBER DIET. Listed below are some foods you may eat and those you should avoid.   --Allowed foods:  White bread without nuts and seeds  White rice, plain white pasta, and crackers  Refined hot cereals, such as Cream of Wheat, or cold cereals with less than 1 gram of fiber per serving  Pancakes or waffles made from white refined flour  Most canned or well-cooked vegetables and fruits without skins or seeds  Fruit and vegetable juice with little or no pulp, fruit-flavored drinks, and flavored boucher  Tender meat, poultry, fish, eggs and tofu  Milk and foods made from milk — such as yogurt, pudding, ice cream, cheeses and sour cream — if tolerated  Butter, margarine, oils and salad dressings without seeds  --Foods to avoid:  Whole-wheat or whole-grain breads, cereals and pasta  Brown or wild rice and other whole grains, such as oats, kasha, barley and quinoa  Dried fruits and prune juice  Raw fruit, including those with seeds, skin or membranes, such as berries  Raw or undercooked vegetables, including corn  Dried beans, peas and lentils  Seeds and nuts and foods containing them, including peanut butter and other nut butters  Coconut  Popcorn   Please follow up with Dr. Deluca next week. Call his office to schedule an appointment: [phone].    MRI: Please be sure to obtain an outpatient contrast-enhanced MRI for 5 cm splenic mass found on CT 8/4/2022.     You have been prescribed oral antibiotics. Please be sure to complete the entire course as directed.    Pain control:  Please take 2 tabs of extra strength Tylenol every 6 hours as needed for pain. DO NOT exceed 4000 mg per day. You have been prescribed oxycodone for pain not controlled by Tylenol. Take 1 tab every 6 hours as needed for severe pain Please do not exceed 4 tabs per day. Take prescribed stool softener (colace) to prevent constipation caused by oxycodone.    Keep well hydrated. Replace fluid loss from ostomy daily. Avoid only drinking plain water. Include Gatorade and/or other vitamin drinks to replace fluid. Try to maintain ostomy output between 1000mL to 1500mL per day. If Ostomy output >1 liter, take 4mg of Imodium, repeat 2mg with each episode of loose stool. Do not exceed 16mg/24 hours.    General Discharge Instructions:  Please resume all regular home medications unless specifically advised not to take a particular medication. Also, please take any new medications as prescribed.  Please get plenty of rest, continue to ambulate several times per day, and drink adequate amounts of fluids. Avoid lifting weights greater than 5-10 lbs until you follow-up with your surgeon, who will instruct you further regarding activity restrictions.  Avoid driving or operating heavy machinery while taking pain medications.  Please follow-up with your surgeon and Primary Care Provider (PCP) as advised.  Incision Care:  *Please call your doctor or nurse practitioner if you have increased pain, swelling, redness, or drainage from the incision site.  *Avoid swimming and baths until your follow-up appointment.  *You may shower, and wash surgical incisions with a mild soap and warm water. Gently pat the area dry.  *If you have staples, they will be removed at your follow-up appointment.  *If you have steri-strips, they will fall off on their own. Please remove any remaining strips 7-10 days after surgery.    Please call your doctor if you experience any of the following:  -New chest pain, pressure, squeezing or tightness.  -New or worsening cough, shortness of breath or wheeze.  -If you are vomiting and cannot keep down medications.  -You see blood or dark black material when vomiting or moving your bowels.  -You experience burning when you urinate, blood in your urine, or experience discharge.  -Your pain is getting worse changes location or moves to your chest or back.  -You have shaking, chills, or fever greater than 100.5F or 38C.  -Any changes in your symptoms that concern you.    Please continue a LOW-FIBER DIET. Listed below are some foods you may eat and those you should avoid.   --Allowed foods:  White bread without nuts and seeds  White rice, plain white pasta, and crackers  Refined hot cereals, such as Cream of Wheat, or cold cereals with less than 1 gram of fiber per serving  Pancakes or waffles made from white refined flour  Most canned or well-cooked vegetables and fruits without skins or seeds  Fruit and vegetable juice with little or no pulp, fruit-flavored drinks, and flavored boucher  Tender meat, poultry, fish, eggs and tofu  Milk and foods made from milk — such as yogurt, pudding, ice cream, cheeses and sour cream — if tolerated  Butter, margarine, oils and salad dressings without seeds  --Foods to avoid:  Whole-wheat or whole-grain breads, cereals and pasta  Brown or wild rice and other whole grains, such as oats, kasha, barley and quinoa  Dried fruits and prune juice  Raw fruit, including those with seeds, skin or membranes, such as berries  Raw or undercooked vegetables, including corn  Dried beans, peas and lentils  Seeds and nuts and foods containing them, including peanut butter and other nut butters  Coconut  Popcorn   Please follow up with Dr. Deluca next week. Call his office to schedule an appointment: [phone].    MRI: Please be sure to obtain an outpatient contrast-enhanced MRI for 5 cm splenic mass found on CT 8/4/2022.     Pain control:  Please take 2 tabs of extra strength Tylenol every 6 hours as needed for pain. DO NOT exceed 4000 mg per day. You have been prescribed oxycodone for pain not controlled by Tylenol. Take 1 tab every 6 hours as needed for severe pain Please do not exceed 4 tabs per day. Take prescribed stool softener (colace) to prevent constipation caused by oxycodone.    Keep well hydrated. Replace fluid loss from ostomy daily. Avoid only drinking plain water. Include Gatorade and/or other vitamin drinks to replace fluid. Try to maintain ostomy output between 1000mL to 1500mL per day. If Ostomy output >1 liter, take 4mg of Imodium, repeat 2mg with each episode of loose stool. Do not exceed 16mg/24 hours.    General Discharge Instructions:  Please resume all regular home medications unless specifically advised not to take a particular medication. Also, please take any new medications as prescribed.  Please get plenty of rest, continue to ambulate several times per day, and drink adequate amounts of fluids. Avoid lifting weights greater than 5-10 lbs until you follow-up with your surgeon, who will instruct you further regarding activity restrictions.  Avoid driving or operating heavy machinery while taking pain medications.  Please follow-up with your surgeon and Primary Care Provider (PCP) as advised.  Incision Care:  *Please call your doctor or nurse practitioner if you have increased pain, swelling, redness, or drainage from the incision site.  *Avoid swimming and baths until your follow-up appointment.  *You may shower, and wash surgical incisions with a mild soap and warm water. Gently pat the area dry.  *If you have staples, they will be removed at your follow-up appointment.  *If you have steri-strips, they will fall off on their own. Please remove any remaining strips 7-10 days after surgery.    Please call your doctor if you experience any of the following:  -New chest pain, pressure, squeezing or tightness.  -New or worsening cough, shortness of breath or wheeze.  -If you are vomiting and cannot keep down medications.  -You see blood or dark black material when vomiting or moving your bowels.  -You experience burning when you urinate, blood in your urine, or experience discharge.  -Your pain is getting worse changes location or moves to your chest or back.  -You have shaking, chills, or fever greater than 100.5F or 38C.  -Any changes in your symptoms that concern you.    Please continue a LOW-FIBER DIET. Listed below are some foods you may eat and those you should avoid.   --Allowed foods:  White bread without nuts and seeds  White rice, plain white pasta, and crackers  Refined hot cereals, such as Cream of Wheat, or cold cereals with less than 1 gram of fiber per serving  Pancakes or waffles made from white refined flour  Most canned or well-cooked vegetables and fruits without skins or seeds  Fruit and vegetable juice with little or no pulp, fruit-flavored drinks, and flavored boucher  Tender meat, poultry, fish, eggs and tofu  Milk and foods made from milk — such as yogurt, pudding, ice cream, cheeses and sour cream — if tolerated  Butter, margarine, oils and salad dressings without seeds  --Foods to avoid:  Whole-wheat or whole-grain breads, cereals and pasta  Brown or wild rice and other whole grains, such as oats, kasha, barley and quinoa  Dried fruits and prune juice  Raw fruit, including those with seeds, skin or membranes, such as berries  Raw or undercooked vegetables, including corn  Dried beans, peas and lentils  Seeds and nuts and foods containing them, including peanut butter and other nut butters  Coconut  Popcorn   Pain control:  Please take 2 tabs of extra strength Tylenol every 6 hours as needed for pain. DO NOT exceed 4000 mg per day. You have been prescribed oxycodone for pain not controlled by Tylenol. Take 1 tab every 6 hours as needed for severe pain Please do not exceed 4 tabs per day. Take prescribed stool softener (colace) to prevent constipation caused by oxycodone.    Keep well hydrated. Replace fluid loss from ostomy daily. Avoid only drinking plain water. Include Gatorade and/or other vitamin drinks to replace fluid. Try to maintain ostomy output between 1000mL to 1500mL per day. If Ostomy output >1 liter, take 4mg of Imodium, repeat 2mg with each episode of loose stool. Do not exceed 16mg/24 hours.    General Discharge Instructions:  Please resume all regular home medications unless specifically advised not to take a particular medication. Also, please take any new medications as prescribed.  Please get plenty of rest, continue to ambulate several times per day, and drink adequate amounts of fluids. Avoid lifting weights greater than 5-10 lbs until you follow-up with your surgeon, who will instruct you further regarding activity restrictions.  Avoid driving or operating heavy machinery while taking pain medications.  Please follow-up with your surgeon and Primary Care Provider (PCP) as advised.  Incision Care:  *Please call your doctor or nurse practitioner if you have increased pain, swelling, redness, or drainage from the incision site.  *Avoid swimming and baths until your follow-up appointment.  *You may shower, and wash surgical incisions with a mild soap and warm water. Gently pat the area dry.  *If you have staples, they will be removed at your follow-up appointment.  *If you have steri-strips, they will fall off on their own. Please remove any remaining strips 7-10 days after surgery.    Please call your doctor if you experience any of the following:  -New chest pain, pressure, squeezing or tightness.  -New or worsening cough, shortness of breath or wheeze.  -If you are vomiting and cannot keep down medications.  -You see blood or dark black material when vomiting or moving your bowels.  -You experience burning when you urinate, blood in your urine, or experience discharge.  -Your pain is getting worse changes location or moves to your chest or back.  -You have shaking, chills, or fever greater than 100.5F or 38C.  -Any changes in your symptoms that concern you.    Please continue a LOW-FIBER DIET. Listed below are some foods you may eat and those you should avoid.   --Allowed foods:  White bread without nuts and seeds  White rice, plain white pasta, and crackers  Refined hot cereals, such as Cream of Wheat, or cold cereals with less than 1 gram of fiber per serving  Pancakes or waffles made from white refined flour  Most canned or well-cooked vegetables and fruits without skins or seeds  Fruit and vegetable juice with little or no pulp, fruit-flavored drinks, and flavored boucher  Tender meat, poultry, fish, eggs and tofu  Milk and foods made from milk — such as yogurt, pudding, ice cream, cheeses and sour cream — if tolerated  Butter, margarine, oils and salad dressings without seeds  --Foods to avoid:  Whole-wheat or whole-grain breads, cereals and pasta  Brown or wild rice and other whole grains, such as oats, kasha, barley and quinoa  Dried fruits and prune juice  Raw fruit, including those with seeds, skin or membranes, such as berries  Raw or undercooked vegetables, including corn  Dried beans, peas and lentils  Seeds and nuts and foods containing them, including peanut butter and other nut butters  Coconut  Popcorn

## 2022-08-08 NOTE — DISCHARGE NOTE PROVIDER - HOSPITAL COURSE
42 M PMH of uncomplicated diverticulitis x3 (last episode 6 months ago) presents with acute complicated diverticulitis with focal perforation. Pt was admitted to surgery team on iv antibiotics ceftriaxone/flagyl and serial abdominal exams. Overnight 8/4, pt spiked fever to 101.7F, iv abx changed to zosyn, and was subsequently taken to the OR for Hartmans procedure. Pt s/p Hartmans procedure 8/4/2022.     Patient's post-operative course was uncomplicated. Pt passed trial of void, NG tube was removed. Ostomy functioning well, and ostomy education provided. Diet was advanced as tolerated and pain was well controlled on medication. CT 8/4/2022 with incidental finding of 5 cm splenic mass for which pt will get outpatient MRI. On day of discharge, pt deemed stable and ready to return home with plan to follow up as an outpatient.    INCOMPLETE *** 42 M PMH of uncomplicated diverticulitis x3 (last episode 6 months ago) presents with acute complicated diverticulitis with focal perforation. Pt was admitted to surgery team on iv antibiotics ceftriaxone/flagyl and serial abdominal exams. Overnight 8/4, pt spiked fever to 101.7F, iv abx changed to zosyn, and was subsequently taken to the OR for Hartmans procedure. Pt s/p Hartmans procedure 8/4/2022.     Patient's post-operative course was uncomplicated. Pt passed trial of void, NG tube was removed. Ostomy functioning well, and ostomy education provided. Diet was advanced as tolerated and pain was well controlled on medication. Pt received ostomy and wound care education, and VNS services were set up. CT 8/4/2022 with incidental finding of 5 cm splenic mass for which pt will get outpatient MRI. On day of discharge, pt deemed stable and ready to return home with plan to follow up as an outpatient. 42 M PMH of uncomplicated diverticulitis x3 (last episode 6 months ago) presents with acute complicated diverticulitis with focal perforation. Pt was admitted to surgery team on iv antibiotics ceftriaxone/flagyl and serial abdominal exams. Overnight 8/4, pt spiked fever to 101.7F, iv abx changed to zosyn, and was subsequently taken to the OR for Hartmans procedure. Pt s/p Hartmans procedure 8/4/2022.     Patient's post-operative course was uncomplicated. Pt passed trial of void, NG tube was removed. Ostomy functioning well, and ostomy education provided. Diet was advanced as tolerated and pain was well controlled on medication. Pt received ostomy and wound care education, and VNS services were set up. CT 8/4/2022 with incidental finding of 5 cm splenic mass for which pt will get outpatient MRI. On day of discharge, pt deemed stable and ready to return home with plan to follow up as an outpatient.    ***INCOMPLETE***  ID Recs / script   MRI script  oxy/colace script 42 M PMH of uncomplicated diverticulitis x3 (last episode 6 months ago) presents with acute complicated diverticulitis with focal perforation. Pt was admitted to surgery team on iv antibiotics ceftriaxone/flagyl and serial abdominal exams. Overnight 8/4, pt spiked fever to 101.7F, iv abx changed to zosyn, and was subsequently taken to the OR for Hartmans procedure. Pt s/p Hartmans procedure 8/4/2022.     Pt passed trial of void, NG tube was removed. Post-operatively, the course was complicated by an ileus. Pt refused an NG Tube, but ambulated well, had ostomy function with liquid output and gas and tolerated CLD. On POD 7, pt was advanced to low fiber diet, which he subsequently developed nausea without vomiting and resolved spontaneously. CT abd pelvis w/ po and iv contrast demonstrated *****.   On POD8, ostomy function returned, ***comment on tolerating diet, n/v****  Diet was advanced as tolerated and pain was well controlled on medication. Pt received ostomy and wound care education, and VNS services were set up. CT 8/4/2022 with incidental finding of 5 cm splenic mass for which pt will get outpatient MRI. On day of discharge, pt deemed stable and ready to return home with plan to follow up as an outpatient.    ***INCOMPLETE***  CT results 8/12   ID Recs / script   MRI script  oxy/colace script 42 M PMH of uncomplicated diverticulitis x3 (last episode 6 months ago) presents with acute complicated diverticulitis with focal perforation. Pt was admitted to surgery team on iv antibiotics ceftriaxone/flagyl and serial abdominal exams. Overnight 8/4, pt spiked fever to 101.7F, iv abx changed to zosyn, and was subsequently taken to the OR for Hartmans procedure. Pt s/p Hartmans procedure 8/4/2022.     Pt passed trial of void, and NG tube was removed. Post-operatively, the course was complicated by an ileus. Pt refused an NG Tube, but ambulated well, had ostomy function with liquid output and gas and tolerated CLD. On POD 7, pt was advanced to low fiber diet, which he subsequently developed nausea that resolved spontaneously without vomiting, and had no ostomy output. CT abd pelvis w/ po and iv contrast 8/12 demonstrated dilation of proximal small bowel without a transition point. Picc line was placed, and pt received TPN for one day. Bowel function returned, ostomy functioning well with stool output, and diet was advanced as tolerated to low fiber diet without nausea or vomiting. Picc line was removed. Pt completed *** day course of zosyn (8/4 - ***). Pain was well controlled on medication.     Pt received ostomy and wound care education, and VNS services were set up.  CT 8/4/2022 with incidental finding of 5 cm splenic mass for which pt will get outpatient MRI. On day of discharge, pt deemed stable and ready to return home with plan to follow up as an outpatient.    ***INCOMPLETE***  ID Recs / script   MRI script  oxy/colace script 42 M PMH of uncomplicated diverticulitis x3 (last episode 6 months ago) presents with acute complicated diverticulitis with focal perforation. Pt was admitted to surgery team on iv antibiotics ceftriaxone/flagyl and serial abdominal exams. Overnight 8/4, pt spiked fever to 101.7F, iv abx changed to zosyn, and was subsequently taken to the OR for Hartmans procedure. Pt s/p Hartmans procedure 8/4/2022.     Pt passed trial of void, and NG tube was removed. Post-operatively, the course was complicated by an ileus. Pt refused an NG Tube, but ambulated well, had ostomy function with liquid output and gas and tolerated CLD. On POD 7, pt was advanced to low fiber diet, which he subsequently developed nausea that resolved spontaneously without vomiting, and had no ostomy output. CT abd pelvis w/ po and iv contrast 8/12 demonstrated dilation of proximal small bowel without a transition point. Picc line was placed, and pt received TPN for one day. Bowel function returned, ostomy functioning well with stool output, and diet was advanced as tolerated to low fiber diet without nausea or vomiting. Picc line was removed. Pt completed 11 day course of zosyn (8/4 - 8/15). Pain was well controlled on medication.     Pt received ostomy and wound care education, and VNS services were set up. CT 8/4/2022 with incidental finding of 5 cm splenic mass for which pt will get outpatient MRI. On day of discharge, pt deemed stable and ready to return home with plan to follow up as an outpatient.    ***INCOMPLETE***  MRI script  oxy/colace script 42 M PMH of uncomplicated diverticulitis x3 (last episode 6 months ago) presents with acute complicated diverticulitis with focal perforation. Pt was admitted to surgery team on iv antibiotics ceftriaxone/flagyl and serial abdominal exams. Overnight 8/4, pt spiked fever to 101.7F, iv abx changed to zosyn, and was subsequently taken to the OR for Hartmans procedure. Pt s/p Hartmans procedure 8/4/2022.     Pt passed trial of void, and NG tube was removed. Post-operatively, the course was complicated by an ileus. Pt refused an NG Tube, but ambulated well, had ostomy function with liquid output and gas and tolerated CLD. On POD 7, pt was advanced to low fiber diet, which he subsequently developed nausea that resolved spontaneously without vomiting, and had no ostomy output. CT abd pelvis w/ po and iv contrast 8/12 demonstrated dilation of proximal small bowel without a transition point. Picc line was placed, and pt received TPN for one day. Bowel function returned, ostomy functioning well with stool output, and diet was advanced as tolerated to low fiber diet without nausea or vomiting. Picc line was removed. Pt completed 11 day course of zosyn (8/4 - 8/15). Pain was well controlled on medication.     Pt received ostomy and wound care education, and VNS services were set up. CT 8/4/2022 with incidental finding of 5 cm splenic mass for which pt will follow-up outpatient to obtain an MRI. On day of discharge, pt deemed stable and ready to return home with plan to follow up as an outpatient.

## 2022-08-08 NOTE — PHYSICAL THERAPY INITIAL EVALUATION ADULT - ADDITIONAL COMMENTS
Patient reports previously independent with all ADLs/IADLs prior to admission. No HHA. Denies history of mechanical falls. Patient with recently (R)meniscal repair surgery. Documenting therapist discussed patient's status with Outpatient PT (Chaparro, [phone]): patient without restrictions, able to progression (R)knee flexion as tolerated (refrain from extensive hamstring curls/stress to hamstrings).

## 2022-08-08 NOTE — DISCHARGE NOTE PROVIDER - INSTRUCTIONS
Please follow a low residue diet, minimizing consumption of dairy, whole grains, and raw vegetables.

## 2022-08-08 NOTE — PROGRESS NOTE ADULT - ASSESSMENT
S/P Perforated diverticulitis  S/P Sabas's procedure  Ileus after patient seemingly regained bowel function  Splenic mass needing further investigation  S/P recent ACL reconstruction in right knee      RECOMMEND  Continue Pip-Tazo  Ileus management by surgical team  MRI to further characterize splenic mass      Discussed with surgical resident and Dr Meeks    [phone]

## 2022-08-08 NOTE — DISCHARGE NOTE PROVIDER - CARE PROVIDER_API CALL
Vitor Deluca)  Surgery  [address] [place] [address]  Phone: [phone]  Fax: [phone]  Follow Up Time: 1 week

## 2022-08-08 NOTE — PROGRESS NOTE ADULT - SUBJECTIVE AND OBJECTIVE BOX
Pt seen and examined   nausea and heartburn  has not taken any narcotics recently    REVIEW OF SYSTEMS:  Constitutional: No fever,  Cardiovascular: No chest pain, palpitations, dizziness or leg swelling  Gastrointestinal: No abdominal or epigastric pain. + nausea, no vomiting + heartburn  Skin: No itching, burning, rashes or lesions       MEDICATIONS:  MEDICATIONS  (STANDING):  famotidine Injectable 20 milliGRAM(s) IV Push once  heparin   Injectable 5000 Unit(s) SubCutaneous every 8 hours  ketorolac   Injectable 15 milliGRAM(s) IV Push every 6 hours  piperacillin/tazobactam IVPB.. 3.375 Gram(s) IV Intermittent every 6 hours    MEDICATIONS  (PRN):  acetaminophen     Tablet .. 650 milliGRAM(s) Oral every 6 hours PRN mild pain (1-3)  melatonin 5 milliGRAM(s) Oral at bedtime PRN Sleep  ondansetron Injectable 4 milliGRAM(s) IV Push every 6 hours PRN Nausea and/or Vomiting  oxyCODONE    IR 5 milliGRAM(s) Oral every 6 hours PRN Moderate Pain (4 - 6)  oxyCODONE    IR 10 milliGRAM(s) Oral every 6 hours PRN Severe Pain (7 - 10)  zolpidem 5 milliGRAM(s) Oral at bedtime PRN Insomnia  zolpidem 5 milliGRAM(s) Oral at bedtime PRN Insomnia      Allergies    No Known Allergies    Intolerances        Vital Signs Last 24 Hrs  T(C): 36.8 (08 Aug 2022 08:13), Max: 37.2 (07 Aug 2022 20:29)  T(F): 98.3 (08 Aug 2022 08:13), Max: 98.9 (07 Aug 2022 20:29)  HR: 57 (08 Aug 2022 08:13) (57 - 85)  BP: 141/91 (08 Aug 2022 08:13) (127/83 - 141/91)  BP(mean): --  RR: 17 (08 Aug 2022 08:13) (17 - 18)  SpO2: 97% (08 Aug 2022 08:13) (95% - 99%)    Parameters below as of 08 Aug 2022 08:13  Patient On (Oxygen Delivery Method): room air        08-07 @ 07:01  -  08-08 @ 07:00  --------------------------------------------------------  IN: 3360 mL / OUT: 2709 mL / NET: 651 mL        PHYSICAL EXAM:    General: Well developed; well nourished; in no acute distress  HEENT: MMM, conjunctiva and sclera clear  Lungs: clear  Heart: regular  Gastrointestinal: Soft non-tender distended; +bowel sounds; ostomy working  Skin: Warm and dry. No obvious rash    LABS:      CBC Full  -  ( 08 Aug 2022 07:01 )  WBC Count : 7.18 K/uL  RBC Count : 3.82 M/uL  Hemoglobin : 11.9 g/dL  Hematocrit : 34.8 %  Platelet Count - Automated : 292 K/uL  Mean Cell Volume : 91.1 fl  Mean Cell Hemoglobin : 31.2 pg  Mean Cell Hemoglobin Concentration : 34.2 gm/dL  Auto Neutrophil # : x  Auto Lymphocyte # : x  Auto Monocyte # : x  Auto Eosinophil # : x  Auto Basophil # : x  Auto Neutrophil % : x  Auto Lymphocyte % : x  Auto Monocyte % : x  Auto Eosinophil % : x  Auto Basophil % : x    08-08    138  |  102  |  7   ----------------------------<  132<H>  4.0   |  26  |  0.77    Ca    8.6      08 Aug 2022 07:01  Phos  3.7     08-08  Mg     2.0     08-08                        RADIOLOGY & ADDITIONAL STUDIES (The following images were personally reviewed):

## 2022-08-09 LAB
ANION GAP SERPL CALC-SCNC: 11 MMOL/L — SIGNIFICANT CHANGE UP (ref 5–17)
BUN SERPL-MCNC: 7 MG/DL — SIGNIFICANT CHANGE UP (ref 7–23)
CALCIUM SERPL-MCNC: 8.8 MG/DL — SIGNIFICANT CHANGE UP (ref 8.4–10.5)
CHLORIDE SERPL-SCNC: 103 MMOL/L — SIGNIFICANT CHANGE UP (ref 96–108)
CO2 SERPL-SCNC: 25 MMOL/L — SIGNIFICANT CHANGE UP (ref 22–31)
CREAT SERPL-MCNC: 0.81 MG/DL — SIGNIFICANT CHANGE UP (ref 0.5–1.3)
CULTURE RESULTS: SIGNIFICANT CHANGE UP
CULTURE RESULTS: SIGNIFICANT CHANGE UP
EGFR: 113 ML/MIN/1.73M2 — SIGNIFICANT CHANGE UP
GLUCOSE SERPL-MCNC: 96 MG/DL — SIGNIFICANT CHANGE UP (ref 70–99)
HCT VFR BLD CALC: 33.1 % — LOW (ref 39–50)
HGB BLD-MCNC: 11.4 G/DL — LOW (ref 13–17)
MAGNESIUM SERPL-MCNC: 1.7 MG/DL — SIGNIFICANT CHANGE UP (ref 1.6–2.6)
MCHC RBC-ENTMCNC: 31.3 PG — SIGNIFICANT CHANGE UP (ref 27–34)
MCHC RBC-ENTMCNC: 34.4 GM/DL — SIGNIFICANT CHANGE UP (ref 32–36)
MCV RBC AUTO: 90.9 FL — SIGNIFICANT CHANGE UP (ref 80–100)
NRBC # BLD: 0 /100 WBCS — SIGNIFICANT CHANGE UP (ref 0–0)
PHOSPHATE SERPL-MCNC: 3.4 MG/DL — SIGNIFICANT CHANGE UP (ref 2.5–4.5)
PLATELET # BLD AUTO: 280 K/UL — SIGNIFICANT CHANGE UP (ref 150–400)
POTASSIUM SERPL-MCNC: 4 MMOL/L — SIGNIFICANT CHANGE UP (ref 3.5–5.3)
POTASSIUM SERPL-SCNC: 4 MMOL/L — SIGNIFICANT CHANGE UP (ref 3.5–5.3)
RBC # BLD: 3.64 M/UL — LOW (ref 4.2–5.8)
RBC # FLD: 11.9 % — SIGNIFICANT CHANGE UP (ref 10.3–14.5)
SODIUM SERPL-SCNC: 139 MMOL/L — SIGNIFICANT CHANGE UP (ref 135–145)
SPECIMEN SOURCE: SIGNIFICANT CHANGE UP
SPECIMEN SOURCE: SIGNIFICANT CHANGE UP
WBC # BLD: 6.65 K/UL — SIGNIFICANT CHANGE UP (ref 3.8–10.5)
WBC # FLD AUTO: 6.65 K/UL — SIGNIFICANT CHANGE UP (ref 3.8–10.5)

## 2022-08-09 RX ORDER — MAGNESIUM SULFATE 500 MG/ML
1 VIAL (ML) INJECTION ONCE
Refills: 0 | Status: COMPLETED | OUTPATIENT
Start: 2022-08-09 | End: 2022-08-09

## 2022-08-09 RX ADMIN — PANTOPRAZOLE SODIUM 40 MILLIGRAM(S): 20 TABLET, DELAYED RELEASE ORAL at 12:06

## 2022-08-09 RX ADMIN — PIPERACILLIN AND TAZOBACTAM 200 GRAM(S): 4; .5 INJECTION, POWDER, LYOPHILIZED, FOR SOLUTION INTRAVENOUS at 12:09

## 2022-08-09 RX ADMIN — Medication 100 GRAM(S): at 07:55

## 2022-08-09 RX ADMIN — HEPARIN SODIUM 5000 UNIT(S): 5000 INJECTION INTRAVENOUS; SUBCUTANEOUS at 05:15

## 2022-08-09 RX ADMIN — PIPERACILLIN AND TAZOBACTAM 200 GRAM(S): 4; .5 INJECTION, POWDER, LYOPHILIZED, FOR SOLUTION INTRAVENOUS at 05:14

## 2022-08-09 RX ADMIN — PIPERACILLIN AND TAZOBACTAM 200 GRAM(S): 4; .5 INJECTION, POWDER, LYOPHILIZED, FOR SOLUTION INTRAVENOUS at 18:45

## 2022-08-09 RX ADMIN — SODIUM CHLORIDE 120 MILLILITER(S): 9 INJECTION, SOLUTION INTRAVENOUS at 20:16

## 2022-08-09 RX ADMIN — HEPARIN SODIUM 5000 UNIT(S): 5000 INJECTION INTRAVENOUS; SUBCUTANEOUS at 21:08

## 2022-08-09 RX ADMIN — Medication 15 MILLIGRAM(S): at 05:15

## 2022-08-09 RX ADMIN — HEPARIN SODIUM 5000 UNIT(S): 5000 INJECTION INTRAVENOUS; SUBCUTANEOUS at 13:17

## 2022-08-09 RX ADMIN — Medication 15 MILLIGRAM(S): at 05:37

## 2022-08-09 NOTE — DIETITIAN INITIAL EVALUATION ADULT - OTHER CALCULATIONS
lbs. %%. ABW used to calculate energy needs due to pt's current body weight within % IBW. Needs adjusted for age and wt, post op demands

## 2022-08-09 NOTE — DIETITIAN INITIAL EVALUATION ADULT - ADD RECOMMEND
1. diet adv as medically feasible within 24-48 hrs, rec slow adv to low fiber diet  >>If unable to adv diet and expect pt to be NPO>7days, consider nutrition support, consult RD for recs  2. BM and pain regimen per team  3. Monitor BMP, BG, POCT, renal indices and LFTs, Lytes, replete prn  4. diet edu at follow up

## 2022-08-09 NOTE — DIETITIAN INITIAL EVALUATION ADULT - PERTINENT MEDS FT
MEDICATIONS  (STANDING):  heparin   Injectable 5000 Unit(s) SubCutaneous every 8 hours  lactated ringers. 1000 milliLiter(s) (120 mL/Hr) IV Continuous <Continuous>  pantoprazole  Injectable 40 milliGRAM(s) IV Push daily  piperacillin/tazobactam IVPB.. 3.375 Gram(s) IV Intermittent every 6 hours    MEDICATIONS  (PRN):  acetaminophen     Tablet .. 650 milliGRAM(s) Oral every 6 hours PRN mild pain (1-3)  HYDROmorphone  Injectable 0.5 milliGRAM(s) IV Push every 6 hours PRN Severe Pain (7 - 10)  melatonin 5 milliGRAM(s) Oral at bedtime PRN Sleep  zolpidem 5 milliGRAM(s) Oral at bedtime PRN Insomnia

## 2022-08-09 NOTE — DIETITIAN INITIAL EVALUATION ADULT - NUTRITIONGOAL OUTCOME1
diet adv within 24-48 hrs as medically feasible. Consistently meet >75% est needs during hospital stay

## 2022-08-09 NOTE — PROGRESS NOTE ADULT - SUBJECTIVE AND OBJECTIVE BOX
STATUS POST:  8/4: Hartmans procedure        SUBJECTIVE: Patient seen and examined bedside by chief resident. patient feeling well. after walking last night he felt so much better. nausea completely resolved. no vomiting. denies abdominal pain.     heparin   Injectable 5000 Unit(s) SubCutaneous every 8 hours  piperacillin/tazobactam IVPB.. 3.375 Gram(s) IV Intermittent every 6 hours      Vital Signs Last 24 Hrs  T(C): 36.7 (09 Aug 2022 04:38), Max: 37.6 (08 Aug 2022 23:58)  T(F): 98 (09 Aug 2022 04:38), Max: 99.6 (08 Aug 2022 23:58)  HR: 63 (09 Aug 2022 04:38) (57 - 67)  BP: 133/86 (09 Aug 2022 04:38) (133/86 - 155/95)  BP(mean): --  RR: 18 (09 Aug 2022 04:38) (17 - 19)  SpO2: 95% (09 Aug 2022 04:38) (95% - 97%)    Parameters below as of 09 Aug 2022 04:38  Patient On (Oxygen Delivery Method): room air      I&O's Detail    08 Aug 2022 07:01  -  09 Aug 2022 07:00  --------------------------------------------------------  IN:    IV PiggyBack: 400 mL    Lactated Ringers: 1500 mL    Oral Fluid: 960 mL  Total IN: 2860 mL    OUT:    Colostomy (mL): 97 mL    Voided (mL): 1020 mL  Total OUT: 1117 mL    Total NET: 1743 mL          General: NAD, resting comfortably in bed  C/V: NSR  Pulm: Nonlabored breathing, no respiratory distress  Abd: soft, NT, some distention, midline incision with staples, packing removed and replaced, no signs of infection, ostomy with gas and stool in bag  Extrem: WWP, no edema, SCDs in place        LABS:                        11.4   6.65  )-----------( 280      ( 09 Aug 2022 05:30 )             33.1     08-09    139  |  103  |  7   ----------------------------<  96  4.0   |  25  |  0.81    Ca    8.8      09 Aug 2022 05:30  Phos  3.4     08-09  Mg     1.7     08-09            RADIOLOGY & ADDITIONAL STUDIES:

## 2022-08-09 NOTE — DIETITIAN INITIAL EVALUATION ADULT - ENERGY INTAKE
NPO 42 M PMH of uncomplicated diverticulitis x3 (last episode 6 months ago) presents with acute complicated diverticulitis with focal perforation, on IV antibiotics now s/p Hartmans procedure on 8/4. Pending final read of abd xray 8/8.    Pt seen resting in bed. Was previously on clear liquids, now NPO, d/t complaints of heartburn, nausea w/ gagging previous day after ingesting liquids, no emesis per pt. Symptoms now resolved, he denies any n/v. Ostomy functioning, reports BMs in ostomy pouch. Per flowsheet, 75 cc x 24 hrs. Confirmed ht with pt, ht 6 ft 0 in. He denies changes in wt PTA, normal appetites PTA. NKFA. Discussed possible diet advancement once feasible, pt agreed to defer diet edu for follow up once diet able to adv. No pain reported at this time. Skin: Jacky 20, surgical incision noted, no PU or edema. RD to follow per protocol. Please see below for nutr recs.

## 2022-08-09 NOTE — PROGRESS NOTE ADULT - ASSESSMENT
Perforated diverticulitis  S/P Sabas's procedure  Ileus  Recovering but still NPO      RECOMMEND  Continue Pip-Tazo      Discussed with house staff  [phone]

## 2022-08-09 NOTE — PROGRESS NOTE ADULT - SUBJECTIVE AND OBJECTIVE BOX
Patient seen in the PM    INTERVAL HPI/OVERNIGHT EVENTS:    Improved after large liquid ostomy output and after ambulating  No NGT needed      MEDICATIONS  (STANDING):  heparin   Injectable 5000 Unit(s) SubCutaneous every 8 hours  lactated ringers. 1000 milliLiter(s) (120 mL/Hr) IV Continuous <Continuous>  pantoprazole  Injectable 40 milliGRAM(s) IV Push daily  piperacillin/tazobactam IVPB.. 3.375 Gram(s) IV Intermittent every 6 hours    MEDICATIONS  (PRN):  acetaminophen     Tablet .. 650 milliGRAM(s) Oral every 6 hours PRN mild pain (1-3)  HYDROmorphone  Injectable 0.5 milliGRAM(s) IV Push every 6 hours PRN Severe Pain (7 - 10)  melatonin 5 milliGRAM(s) Oral at bedtime PRN Sleep  zolpidem 5 milliGRAM(s) Oral at bedtime PRN Insomnia      Allergies    No Known Allergies      EXAM  Vital Signs Last 24 Hrs  T(C): 37.5 (09 Aug 2022 20:15), Max: 37.6 (08 Aug 2022 23:58)  T(F): 99.5 (09 Aug 2022 20:15), Max: 99.6 (08 Aug 2022 23:58)  HR: 83 (09 Aug 2022 20:15) (63 - 83)  BP: 145/92 (09 Aug 2022 20:15) (133/86 - 153/84)  BP(mean): --  RR: 18 (09 Aug 2022 20:15) (18 - 18)  SpO2: 96% (09 Aug 2022 20:15) (95% - 98%)    Parameters below as of 09 Aug 2022 20:15  Patient On (Oxygen Delivery Method): room air  Awake and alert  Much more comfortable appearing  No rash  RRR  Chest CTA  Abd softer albeit still quite distended  LEs no edema        LABS:                        11.4   6.65  )-----------( 280      ( 09 Aug 2022 05:30 )             33.1     08-09    139  |  103  |  7   ----------------------------<  96  4.0   |  25  |  0.81    Ca    8.8      09 Aug 2022 05:30  Phos  3.4     08-09  Mg     1.7     08-09        RADIOLOGY & ADDITIONAL STUDIES:    Xray Abdomen 1 View PORTABLE -Urgent (Xray Abdomen 1 View PORTABLE -Urgent .) (08.08.22 @ 18:45) >  PROCEDURE DATE:  08/08/2022    ******PRELIMINARY REPORT******      ******PRELIMINARY REPORT******         INTERPRETATION:  ABDOMINAL RADIOGRAPH    History: Postoperative day 4 from Robbins's procedure, new onset nausea    Prior studies: CT abdomen pelvis dated 8/4/2022 partial comparison to   chest radiographs dated 8/5/2022    Findings: Portablefrontal view of the abdomen demonstrates interval   development of dilated loops of small bowel measuring up to 5.6 cm within   the visible abdomen. Portable supine technique limits evaluation for   air-fluid levels. Diaphragms are not included on this image. Small-volume   oral contrast remains within the margin to. Surgical staples are seen   coursing right of midline. Postsurgical colostomy opening left lower   quadrant. The bones are unremarkable.    Impression: Dilated loops of small bowel with oral contrast retained in   the large intestine, likely represents ileus.

## 2022-08-09 NOTE — DIETITIAN INITIAL EVALUATION ADULT - PERSON TAUGHT/METHOD
discussed possible diet progression. Deferred indepth diet edu for follow up when diet adv/verbal instruction/patient instructed

## 2022-08-09 NOTE — DIETITIAN INITIAL EVALUATION ADULT - PERTINENT LABORATORY DATA
08-09    139  |  103  |  7   ----------------------------<  96  4.0   |  25  |  0.81    Ca    8.8      09 Aug 2022 05:30  Phos  3.4     08-09  Mg     1.7     08-09

## 2022-08-09 NOTE — PROGRESS NOTE ADULT - ASSESSMENT
42 M PMH of uncomplicated diverticulitis x3 (last episode 6 months ago) presents with acute complicated diverticulitis with focal perforation, on IV antibiotics now s/p Hartmans procedure on 8/4.    - NPO/IVF  - Abx: zosyn (8/4 - )  - Pain and Nausea control  - SCD/SQH  - ostomy consult  - IS/OOB  - AM labs  - PT recs: outpatient PT

## 2022-08-09 NOTE — PROGRESS NOTE ADULT - SUBJECTIVE AND OBJECTIVE BOX
Pt seen and examined   events noted  better today  distension yesterday  plain films wit dilated small bowel consistent with ileus  was told may need NGT so he walked a lot and a lot came out of ostomy now flat and better    REVIEW OF SYSTEMS:  Constitutional: No fever, weight loss or fatigue  Cardiovascular: No chest pain, palpitations, dizziness or leg swelling  Gastrointestinal: No abdominal or epigastric pain. No nausea, vomiting or hematemesis; No diarrhea or constipation. No melena or hematochezia.  Skin: No itching, burning, rashes or lesions       MEDICATIONS:  MEDICATIONS  (STANDING):  heparin   Injectable 5000 Unit(s) SubCutaneous every 8 hours  lactated ringers. 1000 milliLiter(s) (120 mL/Hr) IV Continuous <Continuous>  pantoprazole  Injectable 40 milliGRAM(s) IV Push daily  piperacillin/tazobactam IVPB.. 3.375 Gram(s) IV Intermittent every 6 hours    MEDICATIONS  (PRN):  acetaminophen     Tablet .. 650 milliGRAM(s) Oral every 6 hours PRN mild pain (1-3)  HYDROmorphone  Injectable 0.5 milliGRAM(s) IV Push every 6 hours PRN Severe Pain (7 - 10)  melatonin 5 milliGRAM(s) Oral at bedtime PRN Sleep  zolpidem 5 milliGRAM(s) Oral at bedtime PRN Insomnia      Allergies    No Known Allergies    Intolerances        Vital Signs Last 24 Hrs  T(C): 37.1 (09 Aug 2022 09:37), Max: 37.6 (08 Aug 2022 23:58)  T(F): 98.8 (09 Aug 2022 09:37), Max: 99.6 (08 Aug 2022 23:58)  HR: 63 (09 Aug 2022 09:37) (59 - 67)  BP: 153/84 (09 Aug 2022 09:37) (133/86 - 155/95)  BP(mean): --  RR: 18 (09 Aug 2022 09:37) (18 - 19)  SpO2: 98% (09 Aug 2022 09:37) (95% - 98%)    Parameters below as of 09 Aug 2022 09:37  Patient On (Oxygen Delivery Method): room air        08-08 @ 07:01  -  08-09 @ 07:00  --------------------------------------------------------  IN: 2860 mL / OUT: 1117 mL / NET: 1743 mL    08-09 @ 07:01  -  08-09 @ 11:17  --------------------------------------------------------  IN: 0 mL / OUT: 75 mL / NET: -75 mL        PHYSICAL EXAM:    General: Well developed; well nourished; in no acute distress  HEENT: MMM, conjunctiva and sclera clear  Lungs: clear  Heart: regular  Gastrointestinal: Soft non-tender non-distended; Normal bowel sounds; ostomy good and functioning  Skin: Warm and dry. No obvious rash    LABS:      CBC Full  -  ( 09 Aug 2022 05:30 )  WBC Count : 6.65 K/uL  RBC Count : 3.64 M/uL  Hemoglobin : 11.4 g/dL  Hematocrit : 33.1 %  Platelet Count - Automated : 280 K/uL  Mean Cell Volume : 90.9 fl  Mean Cell Hemoglobin : 31.3 pg  Mean Cell Hemoglobin Concentration : 34.4 gm/dL  Auto Neutrophil # : x  Auto Lymphocyte # : x  Auto Monocyte # : x  Auto Eosinophil # : x  Auto Basophil # : x  Auto Neutrophil % : x  Auto Lymphocyte % : x  Auto Monocyte % : x  Auto Eosinophil % : x  Auto Basophil % : x    08-09    139  |  103  |  7   ----------------------------<  96  4.0   |  25  |  0.81    Ca    8.8      09 Aug 2022 05:30  Phos  3.4     08-09  Mg     1.7     08-09                        RADIOLOGY & ADDITIONAL STUDIES (The following images were personally reviewed):

## 2022-08-09 NOTE — DIETITIAN INITIAL EVALUATION ADULT - NSFNSGIIOFT_GEN_A_CORE
08-08-22 @ 07:01  -  08-09-22 @ 07:00  --------------------------------------------------------  OUT:    Colostomy (mL): 97 mL  Total OUT: 97 mL    Total NET: -97 mL      08-09-22 @ 07:01  -  08-09-22 @ 10:25  --------------------------------------------------------  OUT:    Colostomy (mL): 75 mL  Total OUT: 75 mL    Total NET: -75 mL

## 2022-08-10 LAB
ANION GAP SERPL CALC-SCNC: 12 MMOL/L — SIGNIFICANT CHANGE UP (ref 5–17)
BUN SERPL-MCNC: 7 MG/DL — SIGNIFICANT CHANGE UP (ref 7–23)
CALCIUM SERPL-MCNC: 8.7 MG/DL — SIGNIFICANT CHANGE UP (ref 8.4–10.5)
CHLORIDE SERPL-SCNC: 100 MMOL/L — SIGNIFICANT CHANGE UP (ref 96–108)
CO2 SERPL-SCNC: 24 MMOL/L — SIGNIFICANT CHANGE UP (ref 22–31)
CREAT SERPL-MCNC: 0.68 MG/DL — SIGNIFICANT CHANGE UP (ref 0.5–1.3)
EGFR: 119 ML/MIN/1.73M2 — SIGNIFICANT CHANGE UP
GLUCOSE SERPL-MCNC: 94 MG/DL — SIGNIFICANT CHANGE UP (ref 70–99)
HCT VFR BLD CALC: 32.5 % — LOW (ref 39–50)
HGB BLD-MCNC: 11.3 G/DL — LOW (ref 13–17)
MAGNESIUM SERPL-MCNC: 2.1 MG/DL — SIGNIFICANT CHANGE UP (ref 1.6–2.6)
MCHC RBC-ENTMCNC: 31.2 PG — SIGNIFICANT CHANGE UP (ref 27–34)
MCHC RBC-ENTMCNC: 34.8 GM/DL — SIGNIFICANT CHANGE UP (ref 32–36)
MCV RBC AUTO: 89.8 FL — SIGNIFICANT CHANGE UP (ref 80–100)
NRBC # BLD: 0 /100 WBCS — SIGNIFICANT CHANGE UP (ref 0–0)
PHOSPHATE SERPL-MCNC: 3.6 MG/DL — SIGNIFICANT CHANGE UP (ref 2.5–4.5)
PLATELET # BLD AUTO: 278 K/UL — SIGNIFICANT CHANGE UP (ref 150–400)
POTASSIUM SERPL-MCNC: 3.9 MMOL/L — SIGNIFICANT CHANGE UP (ref 3.5–5.3)
POTASSIUM SERPL-SCNC: 3.9 MMOL/L — SIGNIFICANT CHANGE UP (ref 3.5–5.3)
RBC # BLD: 3.62 M/UL — LOW (ref 4.2–5.8)
RBC # FLD: 11.7 % — SIGNIFICANT CHANGE UP (ref 10.3–14.5)
SODIUM SERPL-SCNC: 136 MMOL/L — SIGNIFICANT CHANGE UP (ref 135–145)
WBC # BLD: 6.72 K/UL — SIGNIFICANT CHANGE UP (ref 3.8–10.5)
WBC # FLD AUTO: 6.72 K/UL — SIGNIFICANT CHANGE UP (ref 3.8–10.5)

## 2022-08-10 RX ORDER — OXYCODONE HYDROCHLORIDE 5 MG/1
5 TABLET ORAL EVERY 6 HOURS
Refills: 0 | Status: DISCONTINUED | OUTPATIENT
Start: 2022-08-10 | End: 2022-08-15

## 2022-08-10 RX ADMIN — PIPERACILLIN AND TAZOBACTAM 200 GRAM(S): 4; .5 INJECTION, POWDER, LYOPHILIZED, FOR SOLUTION INTRAVENOUS at 01:05

## 2022-08-10 RX ADMIN — SODIUM CHLORIDE 120 MILLILITER(S): 9 INJECTION, SOLUTION INTRAVENOUS at 01:05

## 2022-08-10 RX ADMIN — PIPERACILLIN AND TAZOBACTAM 200 GRAM(S): 4; .5 INJECTION, POWDER, LYOPHILIZED, FOR SOLUTION INTRAVENOUS at 18:05

## 2022-08-10 RX ADMIN — HEPARIN SODIUM 5000 UNIT(S): 5000 INJECTION INTRAVENOUS; SUBCUTANEOUS at 21:39

## 2022-08-10 RX ADMIN — PANTOPRAZOLE SODIUM 40 MILLIGRAM(S): 20 TABLET, DELAYED RELEASE ORAL at 11:07

## 2022-08-10 RX ADMIN — ZOLPIDEM TARTRATE 5 MILLIGRAM(S): 10 TABLET ORAL at 23:00

## 2022-08-10 RX ADMIN — PIPERACILLIN AND TAZOBACTAM 200 GRAM(S): 4; .5 INJECTION, POWDER, LYOPHILIZED, FOR SOLUTION INTRAVENOUS at 23:01

## 2022-08-10 RX ADMIN — HEPARIN SODIUM 5000 UNIT(S): 5000 INJECTION INTRAVENOUS; SUBCUTANEOUS at 13:08

## 2022-08-10 RX ADMIN — PIPERACILLIN AND TAZOBACTAM 200 GRAM(S): 4; .5 INJECTION, POWDER, LYOPHILIZED, FOR SOLUTION INTRAVENOUS at 13:08

## 2022-08-10 RX ADMIN — HEPARIN SODIUM 5000 UNIT(S): 5000 INJECTION INTRAVENOUS; SUBCUTANEOUS at 05:05

## 2022-08-10 RX ADMIN — PIPERACILLIN AND TAZOBACTAM 200 GRAM(S): 4; .5 INJECTION, POWDER, LYOPHILIZED, FOR SOLUTION INTRAVENOUS at 07:04

## 2022-08-10 RX ADMIN — SODIUM CHLORIDE 120 MILLILITER(S): 9 INJECTION, SOLUTION INTRAVENOUS at 21:41

## 2022-08-10 NOTE — PROGRESS NOTE ADULT - SUBJECTIVE AND OBJECTIVE BOX
STATUS POST:  Hartmanns procedure      SUBJECTIVE: Patient seen and examined bedside by chief resident. patient had some nausea overnight. still has some nausea however significantly improved since last night. denies vomiting. ambulating without issues. would like to drink something today      heparin   Injectable 5000 Unit(s) SubCutaneous every 8 hours  piperacillin/tazobactam IVPB.. 3.375 Gram(s) IV Intermittent every 6 hours      Vital Signs Last 24 Hrs  T(C): 36.5 (10 Aug 2022 04:51), Max: 37.5 (09 Aug 2022 20:15)  T(F): 97.7 (10 Aug 2022 04:51), Max: 99.5 (09 Aug 2022 20:15)  HR: 58 (10 Aug 2022 04:51) (58 - 83)  BP: 152/84 (10 Aug 2022 04:51) (137/80 - 153/84)  BP(mean): --  RR: 18 (10 Aug 2022 04:51) (18 - 18)  SpO2: 96% (10 Aug 2022 04:51) (96% - 98%)    Parameters below as of 10 Aug 2022 04:51  Patient On (Oxygen Delivery Method): room air      I&O's Detail    09 Aug 2022 07:01  -  10 Aug 2022 07:00  --------------------------------------------------------  IN:    IV PiggyBack: 100 mL    Lactated Ringers: 1320 mL  Total IN: 1420 mL    OUT:    Colostomy (mL): 400 mL    Oral Fluid: 0 mL    Voided (mL): 1425 mL  Total OUT: 1825 mL    Total NET: -405 mL          General: NAD, resting comfortably in bed  C/V: NSR  Pulm: Nonlabored breathing, no respiratory distress  Abd: soft, NT, some distention, midline incision with staples, packing removed and replaced, no signs of infection, ostomy with gas and stool in bag  Extrem: WWP, no edema, SCDs in place        LABS:                        11.4   6.65  )-----------( 280      ( 09 Aug 2022 05:30 )             33.1     08-09    139  |  103  |  7   ----------------------------<  96  4.0   |  25  |  0.81    Ca    8.8      09 Aug 2022 05:30  Phos  3.4     08-09  Mg     1.7     08-09            RADIOLOGY & ADDITIONAL STUDIES:

## 2022-08-10 NOTE — PROGRESS NOTE ADULT - SUBJECTIVE AND OBJECTIVE BOX
INTERVAL HPI/OVERNIGHT EVENTS:    ANTIBIOTICS/RELEVANT:    MEDICATIONS  (STANDING):  heparin   Injectable 5000 Unit(s) SubCutaneous every 8 hours  lactated ringers. 1000 milliLiter(s) (120 mL/Hr) IV Continuous <Continuous>  pantoprazole  Injectable 40 milliGRAM(s) IV Push daily  piperacillin/tazobactam IVPB.. 3.375 Gram(s) IV Intermittent every 6 hours    MEDICATIONS  (PRN):  acetaminophen     Tablet .. 650 milliGRAM(s) Oral every 6 hours PRN mild pain (1-3)  melatonin 5 milliGRAM(s) Oral at bedtime PRN Sleep  oxyCODONE    IR 5 milliGRAM(s) Oral every 6 hours PRN Severe Pain (7 - 10)  zolpidem 5 milliGRAM(s) Oral at bedtime PRN Insomnia      Allergies    No Known Allergies    Intolerances        Vital Signs Last 24 Hrs  T(C): 36.6 (10 Aug 2022 16:51), Max: 37.5 (09 Aug 2022 20:15)  T(F): 97.8 (10 Aug 2022 16:51), Max: 99.5 (09 Aug 2022 20:15)  HR: 96 (10 Aug 2022 16:51) (58 - 96)  BP: 125/86 (10 Aug 2022 16:51) (125/86 - 157/92)  BP(mean): --  RR: 18 (10 Aug 2022 16:51) (17 - 18)  SpO2: 96% (10 Aug 2022 16:51) (96% - 98%)    Parameters below as of 10 Aug 2022 16:51  Patient On (Oxygen Delivery Method): room air        PHYSICAL EXAM:      Constitutional:    Eyes:    ENMT:    Neck:    Breasts:    Back:    Respiratory:    Cardiovascular:    Gastrointestinal:    Genitourinary:    Rectal:    Extremities:    Vascular:    Neurological:    Skin:    Lymph Nodes:    Musculoskeletal:    Psychiatric:        LABS:                        11.3   6.72  )-----------( 278      ( 10 Aug 2022 07:17 )             32.5     08-10    136  |  100  |  7   ----------------------------<  94  3.9   |  24  |  0.68    Ca    8.7      10 Aug 2022 07:17  Phos  3.6     08-10  Mg     2.1     08-10            MICROBIOLOGY:    RADIOLOGY & ADDITIONAL STUDIES: INTERVAL HPI/OVERNIGHT EVENTS:    Doing better and tolerating sips of water      MEDICATIONS  (STANDING):  heparin   Injectable 5000 Unit(s) SubCutaneous every 8 hours  lactated ringers. 1000 milliLiter(s) (120 mL/Hr) IV Continuous <Continuous>  pantoprazole  Injectable 40 milliGRAM(s) IV Push daily  piperacillin/tazobactam IVPB.. 3.375 Gram(s) IV Intermittent every 6 hours    MEDICATIONS  (PRN):  acetaminophen     Tablet .. 650 milliGRAM(s) Oral every 6 hours PRN mild pain (1-3)  melatonin 5 milliGRAM(s) Oral at bedtime PRN Sleep  oxyCODONE    IR 5 milliGRAM(s) Oral every 6 hours PRN Severe Pain (7 - 10)  zolpidem 5 milliGRAM(s) Oral at bedtime PRN Insomnia      Allergies    No Known Allergies    EXAM  Vital Signs Last 24 Hrs  T(C): 36.6 (10 Aug 2022 16:51), Max: 37.5 (09 Aug 2022 20:15)  T(F): 97.8 (10 Aug 2022 16:51), Max: 99.5 (09 Aug 2022 20:15)  HR: 96 (10 Aug 2022 16:51) (58 - 96)  BP: 125/86 (10 Aug 2022 16:51) (125/86 - 157/92)  BP(mean): --  RR: 18 (10 Aug 2022 16:51) (17 - 18)  SpO2: 96% (10 Aug 2022 16:51) (96% - 98%)    Parameters below as of 10 Aug 2022 16:51  Patient On (Oxygen Delivery Method): room air        PHYSICAL EXAM:      Constitutional:    Eyes:    ENMT:    Neck:    Breasts:    Back:    Respiratory:    Cardiovascular:    Gastrointestinal:    Genitourinary:    Rectal:    Extremities:    Vascular:    Neurological:    Skin:    Lymph Nodes:    Musculoskeletal:    Psychiatric:        LABS:                        11.3   6.72  )-----------( 278      ( 10 Aug 2022 07:17 )             32.5     08-10    136  |  100  |  7   ----------------------------<  94  3.9   |  24  |  0.68    Ca    8.7      10 Aug 2022 07:17  Phos  3.6     08-10  Mg     2.1     08-10            MICROBIOLOGY:    RADIOLOGY & ADDITIONAL STUDIES: INTERVAL HPI/OVERNIGHT EVENTS:    Doing better and tolerating sips of water      MEDICATIONS  (STANDING):  heparin   Injectable 5000 Unit(s) SubCutaneous every 8 hours  lactated ringers. 1000 milliLiter(s) (120 mL/Hr) IV Continuous <Continuous>  pantoprazole  Injectable 40 milliGRAM(s) IV Push daily  piperacillin/tazobactam IVPB.. 3.375 Gram(s) IV Intermittent every 6 hours    MEDICATIONS  (PRN):  acetaminophen     Tablet .. 650 milliGRAM(s) Oral every 6 hours PRN mild pain (1-3)  melatonin 5 milliGRAM(s) Oral at bedtime PRN Sleep  oxyCODONE    IR 5 milliGRAM(s) Oral every 6 hours PRN Severe Pain (7 - 10)  zolpidem 5 milliGRAM(s) Oral at bedtime PRN Insomnia      Allergies    No Known Allergies    EXAM  Vital Signs Last 24 Hrs  T(C): 36.6 (10 Aug 2022 16:51), Max: 37.5 (09 Aug 2022 20:15)  T(F): 97.8 (10 Aug 2022 16:51), Max: 99.5 (09 Aug 2022 20:15)  HR: 96 (10 Aug 2022 16:51) (58 - 96)  BP: 125/86 (10 Aug 2022 16:51) (125/86 - 157/92)  BP(mean): --  RR: 18 (10 Aug 2022 16:51) (17 - 18)  SpO2: 96% (10 Aug 2022 16:51) (96% - 98%)    Parameters below as of 10 Aug 2022 16:51  Patient On (Oxygen Delivery Method): room air  Awake and alert  More comfortable appearing  No rash  RRR  Chest CTA  Abd softer and less tender  Output via colostomy      LABS:                        11.3   6.72  )-----------( 278      ( 10 Aug 2022 07:17 )             32.5     08-10    136  |  100  |  7   ----------------------------<  94  3.9   |  24  |  0.68    Ca    8.7      10 Aug 2022 07:17  Phos  3.6     08-10  Mg     2.1     08-10

## 2022-08-10 NOTE — PROGRESS NOTE ADULT - ASSESSMENT
S/P Perforated diverticulitis  S/P Sabas's  Ileus  Slowly improving  Splenic mass - to be further evaluated    RECOMMEND  Continue Pip-Tazo      Discussed with surgical house staff    [phone]

## 2022-08-10 NOTE — PROGRESS NOTE ADULT - SUBJECTIVE AND OBJECTIVE BOX
Pt seen and examined   some nausea   no vomiting  otherwise feels better today    REVIEW OF SYSTEMS:  Constitutional: No fever,   Cardiovascular: No chest pain, palpitations, d  Gastrointestinal: No abdominal or epigastric pain.  nausea last pm, no vomiting  Skin: No itching, burning, rashes or lesions       MEDICATIONS:  MEDICATIONS  (STANDING):  heparin   Injectable 5000 Unit(s) SubCutaneous every 8 hours  lactated ringers. 1000 milliLiter(s) (120 mL/Hr) IV Continuous <Continuous>  pantoprazole  Injectable 40 milliGRAM(s) IV Push daily  piperacillin/tazobactam IVPB.. 3.375 Gram(s) IV Intermittent every 6 hours    MEDICATIONS  (PRN):  acetaminophen     Tablet .. 650 milliGRAM(s) Oral every 6 hours PRN mild pain (1-3)  HYDROmorphone  Injectable 0.5 milliGRAM(s) IV Push every 6 hours PRN Severe Pain (7 - 10)  melatonin 5 milliGRAM(s) Oral at bedtime PRN Sleep  zolpidem 5 milliGRAM(s) Oral at bedtime PRN Insomnia      Allergies    No Known Allergies    Intolerances        Vital Signs Last 24 Hrs  T(C): 36.8 (10 Aug 2022 08:25), Max: 37.5 (09 Aug 2022 20:15)  T(F): 98.2 (10 Aug 2022 08:25), Max: 99.5 (09 Aug 2022 20:15)  HR: 70 (10 Aug 2022 08:25) (58 - 83)  BP: 157/92 (10 Aug 2022 08:25) (137/80 - 157/92)  BP(mean): --  RR: 18 (10 Aug 2022 08:25) (18 - 18)  SpO2: 98% (10 Aug 2022 08:25) (96% - 98%)    Parameters below as of 10 Aug 2022 08:25  Patient On (Oxygen Delivery Method): room air        08-09 @ 07:01  -  08-10 @ 07:00  --------------------------------------------------------  IN: 1420 mL / OUT: 1825 mL / NET: -405 mL        PHYSICAL EXAM:    General: ; in no acute distress  HEENT: MMM, conjunctiva and sclera clear  Lungs: clear  Heart: regular  Gastrointestinal: Soft non-tender non-distended; Normal bowel sounds; ostomy good  Skin: Warm and dry. No obvious rash    LABS:      CBC Full  -  ( 10 Aug 2022 07:17 )  WBC Count : 6.72 K/uL  RBC Count : 3.62 M/uL  Hemoglobin : 11.3 g/dL  Hematocrit : 32.5 %  Platelet Count - Automated : 278 K/uL  Mean Cell Volume : 89.8 fl  Mean Cell Hemoglobin : 31.2 pg  Mean Cell Hemoglobin Concentration : 34.8 gm/dL  Auto Neutrophil # : x  Auto Lymphocyte # : x  Auto Monocyte # : x  Auto Eosinophil # : x  Auto Basophil # : x  Auto Neutrophil % : x  Auto Lymphocyte % : x  Auto Monocyte % : x  Auto Eosinophil % : x  Auto Basophil % : x    08-10    136  |  100  |  7   ----------------------------<  94  3.9   |  24  |  0.68    Ca    8.7      10 Aug 2022 07:17  Phos  3.6     08-10  Mg     2.1     08-10                        RADIOLOGY & ADDITIONAL STUDIES (The following images were personally reviewed):

## 2022-08-11 LAB
ANION GAP SERPL CALC-SCNC: 13 MMOL/L — SIGNIFICANT CHANGE UP (ref 5–17)
BUN SERPL-MCNC: 8 MG/DL — SIGNIFICANT CHANGE UP (ref 7–23)
CALCIUM SERPL-MCNC: 9 MG/DL — SIGNIFICANT CHANGE UP (ref 8.4–10.5)
CHLORIDE SERPL-SCNC: 99 MMOL/L — SIGNIFICANT CHANGE UP (ref 96–108)
CO2 SERPL-SCNC: 24 MMOL/L — SIGNIFICANT CHANGE UP (ref 22–31)
CREAT SERPL-MCNC: 0.74 MG/DL — SIGNIFICANT CHANGE UP (ref 0.5–1.3)
EGFR: 116 ML/MIN/1.73M2 — SIGNIFICANT CHANGE UP
GLUCOSE SERPL-MCNC: 79 MG/DL — SIGNIFICANT CHANGE UP (ref 70–99)
HCT VFR BLD CALC: 33.3 % — LOW (ref 39–50)
HGB BLD-MCNC: 11.5 G/DL — LOW (ref 13–17)
MAGNESIUM SERPL-MCNC: 1.9 MG/DL — SIGNIFICANT CHANGE UP (ref 1.6–2.6)
MCHC RBC-ENTMCNC: 31.1 PG — SIGNIFICANT CHANGE UP (ref 27–34)
MCHC RBC-ENTMCNC: 34.5 GM/DL — SIGNIFICANT CHANGE UP (ref 32–36)
MCV RBC AUTO: 90 FL — SIGNIFICANT CHANGE UP (ref 80–100)
NRBC # BLD: 0 /100 WBCS — SIGNIFICANT CHANGE UP (ref 0–0)
PHOSPHATE SERPL-MCNC: 3.8 MG/DL — SIGNIFICANT CHANGE UP (ref 2.5–4.5)
PLATELET # BLD AUTO: 307 K/UL — SIGNIFICANT CHANGE UP (ref 150–400)
POTASSIUM SERPL-MCNC: 3.9 MMOL/L — SIGNIFICANT CHANGE UP (ref 3.5–5.3)
POTASSIUM SERPL-SCNC: 3.9 MMOL/L — SIGNIFICANT CHANGE UP (ref 3.5–5.3)
RBC # BLD: 3.7 M/UL — LOW (ref 4.2–5.8)
RBC # FLD: 11.8 % — SIGNIFICANT CHANGE UP (ref 10.3–14.5)
SODIUM SERPL-SCNC: 136 MMOL/L — SIGNIFICANT CHANGE UP (ref 135–145)
WBC # BLD: 6.19 K/UL — SIGNIFICANT CHANGE UP (ref 3.8–10.5)
WBC # FLD AUTO: 6.19 K/UL — SIGNIFICANT CHANGE UP (ref 3.8–10.5)

## 2022-08-11 RX ADMIN — PIPERACILLIN AND TAZOBACTAM 200 GRAM(S): 4; .5 INJECTION, POWDER, LYOPHILIZED, FOR SOLUTION INTRAVENOUS at 18:00

## 2022-08-11 RX ADMIN — PIPERACILLIN AND TAZOBACTAM 200 GRAM(S): 4; .5 INJECTION, POWDER, LYOPHILIZED, FOR SOLUTION INTRAVENOUS at 13:00

## 2022-08-11 RX ADMIN — HEPARIN SODIUM 5000 UNIT(S): 5000 INJECTION INTRAVENOUS; SUBCUTANEOUS at 04:54

## 2022-08-11 RX ADMIN — PANTOPRAZOLE SODIUM 40 MILLIGRAM(S): 20 TABLET, DELAYED RELEASE ORAL at 11:05

## 2022-08-11 RX ADMIN — HEPARIN SODIUM 5000 UNIT(S): 5000 INJECTION INTRAVENOUS; SUBCUTANEOUS at 13:00

## 2022-08-11 RX ADMIN — HEPARIN SODIUM 5000 UNIT(S): 5000 INJECTION INTRAVENOUS; SUBCUTANEOUS at 22:19

## 2022-08-11 RX ADMIN — SODIUM CHLORIDE 120 MILLILITER(S): 9 INJECTION, SOLUTION INTRAVENOUS at 04:54

## 2022-08-11 RX ADMIN — PIPERACILLIN AND TAZOBACTAM 200 GRAM(S): 4; .5 INJECTION, POWDER, LYOPHILIZED, FOR SOLUTION INTRAVENOUS at 04:54

## 2022-08-11 NOTE — DISCHARGE NOTE NURSING/CASE MANAGEMENT/SOCIAL WORK - NSDCFUADDAPPT_GEN_ALL_CORE_FT
Please follow up with Dr. Deluca next week. Call his office to schedule an appointment: [phone]. Please follow-up with Dr. Deluca about obtaining an outpatient contrast-enhanced MRI for 5 cm splenic mass found on CT on 8/4/2022.

## 2022-08-11 NOTE — PROGRESS NOTE ADULT - SUBJECTIVE AND OBJECTIVE BOX
Pt seen and examined   no complaints  tolerating diet    REVIEW OF SYSTEMS:  Constitutional: No fever, weight loss or fatigue  Cardiovascular: No chest pain, palpitations, dizziness   Gastrointestinal: No abdominal or epigastric pain. No nausea, vomiting   Skin: No itching, burning, rashes or lesions       MEDICATIONS:  MEDICATIONS  (STANDING):  heparin   Injectable 5000 Unit(s) SubCutaneous every 8 hours  lactated ringers. 1000 milliLiter(s) (120 mL/Hr) IV Continuous <Continuous>  pantoprazole  Injectable 40 milliGRAM(s) IV Push daily  piperacillin/tazobactam IVPB.. 3.375 Gram(s) IV Intermittent every 6 hours    MEDICATIONS  (PRN):  acetaminophen     Tablet .. 650 milliGRAM(s) Oral every 6 hours PRN mild pain (1-3)  melatonin 5 milliGRAM(s) Oral at bedtime PRN Sleep  oxyCODONE    IR 5 milliGRAM(s) Oral every 6 hours PRN Severe Pain (7 - 10)  zolpidem 5 milliGRAM(s) Oral at bedtime PRN Insomnia      Allergies    No Known Allergies    Intolerances        Vital Signs Last 24 Hrs  T(C): 37.1 (11 Aug 2022 08:15), Max: 37.1 (11 Aug 2022 08:15)  T(F): 98.7 (11 Aug 2022 08:15), Max: 98.7 (11 Aug 2022 08:15)  HR: 58 (11 Aug 2022 08:15) (58 - 96)  BP: 135/83 (11 Aug 2022 08:15) (125/86 - 153/81)  BP(mean): --  RR: 18 (11 Aug 2022 08:15) (17 - 18)  SpO2: 98% (11 Aug 2022 08:15) (96% - 98%)    Parameters below as of 11 Aug 2022 08:15  Patient On (Oxygen Delivery Method): room air        08-10 @ 07:01 - 08-11 @ 07:00  --------------------------------------------------------  IN: 2680 mL / OUT: 2800 mL / NET: -120 mL    08-11 @ 07:01  -  08-11 @ 11:52  --------------------------------------------------------  IN: 920 mL / OUT: 1200 mL / NET: -280 mL        PHYSICAL EXAM:    General: Well developed; well nourished; in no acute distress  HEENT: MMM, conjunctiva and sclera clear  Gastrointestinal: Soft non-tender non-distended; Normal bowel sounds; colostomy  Skin: Warm and dry. No obvious rash    LABS:      CBC Full  -  ( 11 Aug 2022 05:11 )  WBC Count : 6.19 K/uL  RBC Count : 3.70 M/uL  Hemoglobin : 11.5 g/dL  Hematocrit : 33.3 %  Platelet Count - Automated : 307 K/uL  Mean Cell Volume : 90.0 fl  Mean Cell Hemoglobin : 31.1 pg  Mean Cell Hemoglobin Concentration : 34.5 gm/dL  Auto Neutrophil # : x  Auto Lymphocyte # : x  Auto Monocyte # : x  Auto Eosinophil # : x  Auto Basophil # : x  Auto Neutrophil % : x  Auto Lymphocyte % : x  Auto Monocyte % : x  Auto Eosinophil % : x  Auto Basophil % : x    08-11    136  |  99  |  8   ----------------------------<  79  3.9   |  24  |  0.74    Ca    9.0      11 Aug 2022 05:11  Phos  3.8     08-11  Mg     1.9     08-11                        RADIOLOGY & ADDITIONAL STUDIES (The following images were personally reviewed):

## 2022-08-11 NOTE — PROGRESS NOTE ADULT - ASSESSMENT
42 M PMH of uncomplicated diverticulitis x3 (last episode 6 months ago) presents with acute complicated diverticulitis with focal perforation, on IV antibiotics now s/p Hartmans procedure on 8/4.    - CLD/IVF  - Abx: zosyn (8/4 - )  - Pain and Nausea control  - SCD/SQH  - ostomy consult  - IS/OOB  - AM labs  - PT recs: outpatient PT   - outpt MRI for 5cm splenic mass

## 2022-08-11 NOTE — PROGRESS NOTE ADULT - SUBJECTIVE AND OBJECTIVE BOX
INTERVAL HPI/OVERNIGHT EVENTS:    ANTIBIOTICS/RELEVANT:    MEDICATIONS  (STANDING):  heparin   Injectable 5000 Unit(s) SubCutaneous every 8 hours  lactated ringers. 1000 milliLiter(s) (120 mL/Hr) IV Continuous <Continuous>  pantoprazole  Injectable 40 milliGRAM(s) IV Push daily  piperacillin/tazobactam IVPB.. 3.375 Gram(s) IV Intermittent every 6 hours    MEDICATIONS  (PRN):  acetaminophen     Tablet .. 650 milliGRAM(s) Oral every 6 hours PRN mild pain (1-3)  melatonin 5 milliGRAM(s) Oral at bedtime PRN Sleep  oxyCODONE    IR 5 milliGRAM(s) Oral every 6 hours PRN Severe Pain (7 - 10)  zolpidem 5 milliGRAM(s) Oral at bedtime PRN Insomnia      Allergies    No Known Allergies    Intolerances        Vital Signs Last 24 Hrs  T(C): 37.1 (11 Aug 2022 08:15), Max: 37.1 (11 Aug 2022 08:15)  T(F): 98.7 (11 Aug 2022 08:15), Max: 98.7 (11 Aug 2022 08:15)  HR: 58 (11 Aug 2022 08:15) (58 - 96)  BP: 135/83 (11 Aug 2022 08:15) (125/86 - 153/81)  BP(mean): --  RR: 18 (11 Aug 2022 08:15) (17 - 18)  SpO2: 98% (11 Aug 2022 08:15) (96% - 98%)    Parameters below as of 11 Aug 2022 08:15  Patient On (Oxygen Delivery Method): room air        PHYSICAL EXAM:      Constitutional:    Eyes:    ENMT:    Neck:    Breasts:    Back:    Respiratory:    Cardiovascular:    Gastrointestinal:    Genitourinary:    Rectal:    Extremities:    Vascular:    Neurological:    Skin:    Lymph Nodes:    Musculoskeletal:    Psychiatric:        LABS:                        11.5   6.19  )-----------( 307      ( 11 Aug 2022 05:11 )             33.3     08-11    136  |  99  |  8   ----------------------------<  79  3.9   |  24  |  0.74    Ca    9.0      11 Aug 2022 05:11  Phos  3.8     08-11  Mg     1.9     08-11            MICROBIOLOGY:    RADIOLOGY & ADDITIONAL STUDIES: INTERVAL HPI/OVERNIGHT EVENTS:    Clinically improved  Tolerating clears and diet being advanced     MEDICATIONS  (STANDING):  heparin   Injectable 5000 Unit(s) SubCutaneous every 8 hours  lactated ringers. 1000 milliLiter(s) (120 mL/Hr) IV Continuous <Continuous>  pantoprazole  Injectable 40 milliGRAM(s) IV Push daily  piperacillin/tazobactam IVPB.. 3.375 Gram(s) IV Intermittent every 6 hours    MEDICATIONS  (PRN):  acetaminophen     Tablet .. 650 milliGRAM(s) Oral every 6 hours PRN mild pain (1-3)  melatonin 5 milliGRAM(s) Oral at bedtime PRN Sleep  oxyCODONE    IR 5 milliGRAM(s) Oral every 6 hours PRN Severe Pain (7 - 10)  zolpidem 5 milliGRAM(s) Oral at bedtime PRN Insomnia      Allergies    No Known Allergies      EXAM  Vital Signs Last 24 Hrs  T(C): 37.1 (11 Aug 2022 08:15), Max: 37.1 (11 Aug 2022 08:15)  T(F): 98.7 (11 Aug 2022 08:15), Max: 98.7 (11 Aug 2022 08:15)  HR: 58 (11 Aug 2022 08:15) (58 - 96)  BP: 135/83 (11 Aug 2022 08:15) (125/86 - 153/81)  BP(mean): --  RR: 18 (11 Aug 2022 08:15) (17 - 18)  SpO2: 98% (11 Aug 2022 08:15) (96% - 98%)  Awake and alert  More comfortable appearing  No rash  RRR  Chest CTA  Abd NT with distension appears resolved   LEs no edema      LABS:                        11.5   6.19  )-----------( 307      ( 11 Aug 2022 05:11 )             33.3     08-11    136  |  99  |  8   ----------------------------<  79  3.9   |  24  |  0.74    Ca    9.0      11 Aug 2022 05:11  Phos  3.8     08-11  Mg     1.9     08-11        MICROBIOLOGY:    Culture - Surgical Swab (08.04.22 @ 23:39)    Gram Stain:   Rare Gram positive cocci in pairs  Moderate to Numerous White blood cells    -  Ampicillin: R >16 These ampicillin results predict results for amoxicillin    -  Ampicillin: R >16 These ampicillin results predict results for amoxicillin    -  Ampicillin: S <=8 These ampicillin results predict results for amoxicillin    -  Ampicillin/Sulbactam: S <=4/2 Enterobacter, Klebsiella aerogenes, Citrobacter, and Serratia may develop resistance during prolonged therapy (3-4 days)    -  Ampicillin/Sulbactam: I 16/8 Enterobacter, Klebsiella aerogenes, Citrobacter, and Serratia may develop resistance during prolonged therapy (3-4 days)    -  Ampicillin/Sulbactam: I 16/8 Enterobacter, Klebsiella aerogenes, Citrobacter, and Serratia may develop resistance during prolonged therapy (3-4 days)    -  Cefazolin: R >16 Enterobacter, Klebsiella aerogenes, Citrobacter, and Serratia may develop resistance during prolonged therapy (3-4 days)    -  Cefazolin: I 4 Enterobacter, Klebsiella aerogenes, Citrobacter, and Serratia may develop resistance during prolonged therapy (3-4 days)    -  Cefazolin: S <=2 Enterobacter, Klebsiella aerogenes, Citrobacter, and Serratia may develop resistance during prolonged therapy (3-4 days)    -  Cefepime: S <=2    -  Ceftriaxone: S <=1 Enterobacter, Klebsiella aerogenes, Citrobacter, and Serratia may develop resistance during prolonged therapy    -  Ceftriaxone: S <=1 Enterobacter, Klebsiella aerogenes, Citrobacter, and Serratia may develop resistance during prolonged therapy    -  Ceftriaxone: S <=1 Enterobacter, Klebsiella aerogenes, Citrobacter, and Serratia may develop resistance during prolonged therapy    -  Ciprofloxacin: I 0.5    -  Ciprofloxacin: S <=0.25    -  Ciprofloxacin: S <=0.25    -  Ertapenem: S <=0.5    -  Ertapenem: S <=0.5    -  Ertapenem: S <=0.5    -  Gentamicin: S <=2    -  Gentamicin: S <=2    -  Gentamicin: S <=2    -  Piperacillin/Tazobactam: S <=8    -  Piperacillin/Tazobactam: S <=8    -  Piperacillin/Tazobactam: S <=8    -  Tobramycin: S <=2    -  Tobramycin: S <=2    -  Tobramycin: S <=2    -  Trimethoprim/Sulfamethoxazole: S <=0.5/9.5    -  Trimethoprim/Sulfamethoxazole: S <=0.5/9.5    -  Trimethoprim/Sulfamethoxazole: R >2/38    Specimen Source: .Surgical Swab Intra-abdominal puss    Culture Results:   Rare Escherichia coli  Rare Escherichia coli #2  Rare Morganella morganii  Mixed Anaerobic Yolanda including  Rare Bacteroides thetaiotamcron group ... Beta lactamase positive  Rare Bacteroides vulgatus group ... Beta lactamase positive  Few Actinomyces turicensis  Rare Bacteroides fragilis ... Beta lactamase positive    Organism Identification: Escherichia coli  Escherichia coli  Morganella morganii    Organism: Escherichia coli    Organism: Escherichia coli    Organism: Morganella morganii    Method Type: ALIVIA    Method Type: ALIVIA    Method Type: ALIVIA

## 2022-08-11 NOTE — PROGRESS NOTE ADULT - ASSESSMENT
Perforated diverticulitis  S/P Sabas's procedure  Ileus - appears resolving  Pt now POD #7 on IV antibiotics      RECOMMEND    Continue antibiotics through POD #10  Continue Pip-Tazo until discharge  If discharged before POD #10, continue antibiotic coverage with combination of Cefpodoxime 200 mg twice a day with meals plus Metronidazole 500 mg 3 times a day with meals. If patient intolerant to higher dose of Metronidazole, OK to decrease Metronidazole to 250 mg 3 times a day with meals.  No EtOH while on Metronidazole plus few days after Metronidazole treatment completed.       Discussed with surgical house staff    [phone]

## 2022-08-11 NOTE — PROGRESS NOTE ADULT - SUBJECTIVE AND OBJECTIVE BOX
STATUS POST: 8/4: Hartmans procedure      POST OPERATIVE DAY #: 7    SUBJECTIVE: Pt seen and examined at bedside this am by surgery team. This morning, he feels well; his pain is well-controlled. Mild nausea overnight resolved, no vomiting. Asking to drink liquids. Ostomy output improved. No acute complaints. Denies f/n/v/cp/sob.    Vital Signs Last 24 Hrs  T(C): 36.8 (11 Aug 2022 05:00), Max: 36.8 (10 Aug 2022 08:25)  T(F): 98.3 (11 Aug 2022 05:00), Max: 98.3 (11 Aug 2022 05:00)  HR: 64 (11 Aug 2022 05:00) (64 - 96)  BP: 153/81 (11 Aug 2022 05:00) (125/86 - 157/92)  BP(mean): --  RR: 18 (11 Aug 2022 05:00) (17 - 18)  SpO2: 98% (11 Aug 2022 05:00) (96% - 98%)    Parameters below as of 11 Aug 2022 05:00  Patient On (Oxygen Delivery Method): room air        PHYSICAL EXAM:  Constitutional: awake, alert, NAD, resting comfortably   Respiratory: non labored breathing, no respiratory distress  Cardiovascular: NSR, RRR  Gastrointestinal: soft, appropriately tender, non distended, no rebound, no guarding, ostomy functioning, midline dressing damp, dressing changed and packed.   Extremities: (-) edema, wwp           I&O's Detail    10 Aug 2022 07:01  -  11 Aug 2022 07:00  --------------------------------------------------------  IN:    IV PiggyBack: 200 mL    Lactated Ringers: 2280 mL    Oral Fluid: 200 mL  Total IN: 2680 mL    OUT:    Colostomy (mL): 300 mL    Voided (mL): 2500 mL  Total OUT: 2800 mL    Total NET: -120 mL          LABS:                        11.5   6.19  )-----------( 307      ( 11 Aug 2022 05:11 )             33.3     08-11    136  |  99  |  8   ----------------------------<  79  3.9   |  24  |  0.74    Ca    9.0      11 Aug 2022 05:11  Phos  3.8     08-11  Mg     1.9     08-11            RADIOLOGY & ADDITIONAL STUDIES:

## 2022-08-11 NOTE — DISCHARGE NOTE NURSING/CASE MANAGEMENT/SOCIAL WORK - PATIENT PORTAL LINK FT
You can access the FollowMyHealth Patient Portal offered by [facility] by registering at the following website: [email or web address]. By joining [employer]’s FollowMyHealth portal, you will also be able to view your health information using other applications (apps) compatible with our system.

## 2022-08-12 LAB
ANION GAP SERPL CALC-SCNC: 9 MMOL/L — SIGNIFICANT CHANGE UP (ref 5–17)
BUN SERPL-MCNC: 6 MG/DL — LOW (ref 7–23)
CALCIUM SERPL-MCNC: 9.2 MG/DL — SIGNIFICANT CHANGE UP (ref 8.4–10.5)
CHLORIDE SERPL-SCNC: 102 MMOL/L — SIGNIFICANT CHANGE UP (ref 96–108)
CO2 SERPL-SCNC: 25 MMOL/L — SIGNIFICANT CHANGE UP (ref 22–31)
CREAT SERPL-MCNC: 0.76 MG/DL — SIGNIFICANT CHANGE UP (ref 0.5–1.3)
EGFR: 115 ML/MIN/1.73M2 — SIGNIFICANT CHANGE UP
GLUCOSE SERPL-MCNC: 93 MG/DL — SIGNIFICANT CHANGE UP (ref 70–99)
HCT VFR BLD CALC: 36 % — LOW (ref 39–50)
HGB BLD-MCNC: 12.5 G/DL — LOW (ref 13–17)
MAGNESIUM SERPL-MCNC: 2 MG/DL — SIGNIFICANT CHANGE UP (ref 1.6–2.6)
MCHC RBC-ENTMCNC: 31.4 PG — SIGNIFICANT CHANGE UP (ref 27–34)
MCHC RBC-ENTMCNC: 34.7 GM/DL — SIGNIFICANT CHANGE UP (ref 32–36)
MCV RBC AUTO: 90.5 FL — SIGNIFICANT CHANGE UP (ref 80–100)
NRBC # BLD: 0 /100 WBCS — SIGNIFICANT CHANGE UP (ref 0–0)
PHOSPHATE SERPL-MCNC: 3.6 MG/DL — SIGNIFICANT CHANGE UP (ref 2.5–4.5)
PLATELET # BLD AUTO: 333 K/UL — SIGNIFICANT CHANGE UP (ref 150–400)
POTASSIUM SERPL-MCNC: 4.4 MMOL/L — SIGNIFICANT CHANGE UP (ref 3.5–5.3)
POTASSIUM SERPL-SCNC: 4.4 MMOL/L — SIGNIFICANT CHANGE UP (ref 3.5–5.3)
RBC # BLD: 3.98 M/UL — LOW (ref 4.2–5.8)
RBC # FLD: 11.9 % — SIGNIFICANT CHANGE UP (ref 10.3–14.5)
SARS-COV-2 RNA SPEC QL NAA+PROBE: SIGNIFICANT CHANGE UP
SODIUM SERPL-SCNC: 136 MMOL/L — SIGNIFICANT CHANGE UP (ref 135–145)
WBC # BLD: 5.92 K/UL — SIGNIFICANT CHANGE UP (ref 3.8–10.5)
WBC # FLD AUTO: 5.92 K/UL — SIGNIFICANT CHANGE UP (ref 3.8–10.5)

## 2022-08-12 PROCEDURE — 36573 INSJ PICC RS&I 5 YR+: CPT

## 2022-08-12 PROCEDURE — 74177 CT ABD & PELVIS W/CONTRAST: CPT | Mod: 26

## 2022-08-12 RX ORDER — IOHEXOL 300 MG/ML
30 INJECTION, SOLUTION INTRAVENOUS ONCE
Refills: 0 | Status: DISCONTINUED | OUTPATIENT
Start: 2022-08-12 | End: 2022-08-12

## 2022-08-12 RX ORDER — DIATRIZOATE MEGLUMINE 180 MG/ML
30 INJECTION, SOLUTION INTRAVESICAL ONCE
Refills: 0 | Status: COMPLETED | OUTPATIENT
Start: 2022-08-12 | End: 2022-08-12

## 2022-08-12 RX ADMIN — DIATRIZOATE MEGLUMINE 30 MILLILITER(S): 180 INJECTION, SOLUTION INTRAVESICAL at 11:02

## 2022-08-12 RX ADMIN — PANTOPRAZOLE SODIUM 40 MILLIGRAM(S): 20 TABLET, DELAYED RELEASE ORAL at 11:07

## 2022-08-12 RX ADMIN — HEPARIN SODIUM 5000 UNIT(S): 5000 INJECTION INTRAVENOUS; SUBCUTANEOUS at 21:52

## 2022-08-12 RX ADMIN — HEPARIN SODIUM 5000 UNIT(S): 5000 INJECTION INTRAVENOUS; SUBCUTANEOUS at 05:51

## 2022-08-12 RX ADMIN — HEPARIN SODIUM 5000 UNIT(S): 5000 INJECTION INTRAVENOUS; SUBCUTANEOUS at 14:07

## 2022-08-12 RX ADMIN — SODIUM CHLORIDE 120 MILLILITER(S): 9 INJECTION, SOLUTION INTRAVENOUS at 05:54

## 2022-08-12 NOTE — CHART NOTE - NSCHARTNOTEFT_GEN_A_CORE
Notified by team that patient spiked temperature of 101.7 at  this evening. Patient was examined at bedside. -120 (previously 90s), SBP 150s, saturating 100% on room air. Pt reports significant acute worsening of abdominal pain in the last 30 min associated with rigors and nausea.     Exam: AAOX3  abdomen soft, mild distention, severe LLQ tenderness with rebound and guarding    Plan: Patient added on to OR class 2 for laparoscopic sigmoidectomy, possible open, possible colostomy and abdominal washout. Pt discussed with Dr. Banks
Procedure: Hartmans  Surgeon: Dr. Banks    S: Pt seen and examined at bedside. Patient is lying in bed, complaining of NGT but no pain. Pain well controlled with current regimen. Patient denies fever, nausea, vomiting, chest pain, and shortness of breath. Ostomy present and blakely in place.     O:  T(C): 37.4 (08-05-22 @ 02:39), Max: 37.4 (08-05-22 @ 02:39)  T(F): 99.4 (08-05-22 @ 02:39), Max: 99.4 (08-05-22 @ 02:39)  HR: 118 (08-05-22 @ 03:59) (98 - 121)  BP: 156/97 (08-05-22 @ 03:59) (132/73 - 164/85)  RR: 18 (08-05-22 @ 03:59) (9 - 18)  SpO2: 95% (08-05-22 @ 03:59) (93% - 95%)  Wt(kg): --                        14.7   10.28 )-----------( 257      ( 04 Aug 2022 07:49 )             41.9     08-04    138  |  103  |  11  ----------------------------<  125<H>  3.9   |  24  |  0.73    Ca    9.1      04 Aug 2022 07:49        Physical Exam  General: Patient is doing well and lying in bed comfortably  Constitutional: alert and oriented   Pulm: Nonlabored breathing, no respiratory distress  CV: Regular rate and rhythm, normal sinus rhythm  Abd: soft, nontender, mildly distended. No rebound, no guarding.             Incisions: clean, dry, intact, wet to dry dressing in place, with no surrounding erythema/induration/edema            Ostomy: present, pink, patent. Minimal serosanguinous output, no stool or gas present.   Extremities: warm, well perfused, no edema  Drains: Blakely in place; NGT in place    A/P: 42y Male s/p Hartmans procedure  NPO/IVF  Pain/nausea control prn  SCDs/holding [facility]  Tele
Pt examined with Dr. Banks at bedside. He is currently afebrile, HR 90s /82, sat 100% on RA. AAOX3, reports unchanged significant LLQ pain since arrival in ED. Denies N/V/F/Chills.    Abdomen soft, nondistended,  severe LLQ tenderness with localized guarding.    CT:  Acute diverticulitis distal left colon/proximal sigmoid with   microperforation and focal peritonitis in left lower quadrant    Plan: After extensive discussion with patient regarding likely need for operative intervention and sigmoidectomy with possible colostomy, plan to add on to OR tomorrow for sigmoidectomy. Will closely monitor tonight with serial abdominal exams for opportunity for patient to improve with IV antibiotics with plan for OR in am if no improvement or clinical worsening.
Serial Abdominal Exam    S: Pain is controlled. Mildly improved from previous.     O: Afebrile, remains non tachycardic and normotensive  - Abdomen soft, remains with significant LLQ tenderness mild rebound no global peritonitis      A/P: 41yo male with acute complicated diverticulitis, remains AVSS, exam lateral to previous. Antibiotics administered. Continue to monitor exam
Admitting Diagnosis:   Patient is a 42y old  Male who presents with a chief complaint of Abdominal pain (12 Aug 2022 12:32)      PAST MEDICAL & SURGICAL HISTORY:  Diverticulitis      H/O knee surgery          Current Nutrition Order:   Low fiber diet     PO Intake: Good (%) [   ]  Fair (50-75%) [   ] Poor (<25%) [  x ]     GI Issues: Endorses nausea, no vomit. Minimal output in ostomy bag, ~25 cc x 24hrs    Pain: Denies pain    Skin Integrity: Jacky 20, no PU or edema. surgical incision noted    Labs:   08-12    136  |  102  |  6<L>  ----------------------------<  93  4.4   |  25  |  0.76    Ca    9.2      12 Aug 2022 08:34  Phos  3.6     08-12  Mg     2.0     08-12      CAPILLARY BLOOD GLUCOSE          Medications:  MEDICATIONS  (STANDING):  heparin   Injectable 5000 Unit(s) SubCutaneous every 8 hours  lactated ringers. 1000 milliLiter(s) (120 mL/Hr) IV Continuous <Continuous>  pantoprazole  Injectable 40 milliGRAM(s) IV Push daily    MEDICATIONS  (PRN):  acetaminophen     Tablet .. 650 milliGRAM(s) Oral every 6 hours PRN mild pain (1-3)  melatonin 5 milliGRAM(s) Oral at bedtime PRN Sleep  oxyCODONE    IR 5 milliGRAM(s) Oral every 6 hours PRN Severe Pain (7 - 10)  zolpidem 5 milliGRAM(s) Oral at bedtime PRN Insomnia    Adm Anthropometrics:  Height for BMI (FEET)	6 Feet  Height for BMI (INCHES)	0 Inch(s)  Height for BMI (CENTIMETERS)	182.88 Centimeter(s)  Weight for BMI (lbs)	195 lb  Weight for BMI (kg)	88.5 kg  Body Mass Index	26.4    Weight Change: No new wts in EMR. Please obtain new wts weekly to trend    Estimated energy needs:   lbs. %%. ABW used to calculate energy needs due to pt's current body weight within % IBW. Needs adjusted for age and wt, post op demands  0767-8699 kcals (27-33 kcals/kg, CBW)  88.4-106.08 g protein (1.0-1.2 g/kg, CBW)  9261-7294 mls (25-30 mls/kg, CBW)    Subjective:  42 M PMH of uncomplicated diverticulitis x3 (last episode 6 months ago) presents with acute complicated diverticulitis with focal perforation, on IV antibiotics now s/p Hartmans procedure on 8/4. Pending final read of abd xray 8/8, noted dilated loops of small bowel, likely ileus. Pending CT abd today.    Pt seen sitting in chair today, awaiting for CT. Reports that he had 2 bites of solid food, and experienced nausea. He denies any emesis. Pt agreed to defer diet edu for follow up. Handout provided for pt to review prior to edu. Minimal ostomy output, 25 cc x 24 hrs noted   RD to follow per protocol. Please see below for nutr recs.    Previous Nutrition Diagnosis:  Inadeqate Energy Intake  RT PO<EER, inability to meet  AEB NPO status    Active [  x ]  Resolved [   ] -- PES still pertinent as pt only ate 2 bites of food but remains without PO afterwards    If resolved, new PES:     Goal: Consistently meet >75% est needs as able during hospital stay    Recommendations:  1. Low fiber diet, pending CT results, recommend NPO if ileus  >>If unable to adv diet and expect pt to be NPO>7days, consider nutrition support, consult RD for TPN recs  2. BM and pain regimen per team  3. Monitor BMP, BG, POCT, renal indices and LFTs, Lytes, replete prn  4. diet edu at follow up    Education: Deferred for follow up    Risk Level: High [  x ] Moderate [   ] Low [   ]

## 2022-08-12 NOTE — PROGRESS NOTE ADULT - ASSESSMENT
RECOMMEND  Continue Pip-Tazo  Would not be placing any central lines / PICC as patient coming off of antibiotics soon unless ongoing focus of infection exists on CT  Check ESR and CRP S/P Sabas's for perforated diverticulitis   Ileus post op and intolerance of solid food  No apparent abscess / ongoing infectious focus   Splenic lesions - need further investigation         RECOMMEND  Continue Pip-Tazo   Check ESR, CRP and LDH  Work-up splenic lesion      Discussed with surgical house staff and Dr. Meeks    [phone]

## 2022-08-12 NOTE — ADVANCED PRACTICE NURSE CONSULT - ASSESSMENT
New 2 3/4" [place] 2 piece ostomy appliance placed, pt observing at intervals but became emotional when visualizing staples at midline. No teaching performed at this time but pt's mother will be present for teaching tomorrow. Stoma pink and budded, approx 1 5/8", no effluent in pouch at this time, had recently been emptied. Midline dressing removed in order to remove ostomy appliance, dry dressings placed back over site. Extra supplies at bedside for discharge. 
Pt's mother at bedside for education on ostomy care. New 2 3/4" [place] 2 piece appliance placed with both patient and mother observing. Reviewed previous teaching on frequency of emptying and changing appliance, bathing with and without appliance on, removing gas. Low-fiber diet handout given to patient, along with pamphlet on living with an ostomy. 
42 M PMH of uncomplicated diverticulitis x3 (last episode 6 months ago) presents with acute complicated diverticulitis with focal perforation, on IV antibiotics now s/p Hartmans procedure on 8/4. Began education on care of ostomy using stoma model. Education provided on what a stoma is, what the appliance looks like, frequency of emptying and changing appliance. Appliance intact, stoma sweat in pouch. Extra supplies left at bedside. 
New 2 3/4 [place] 2 piece appliance placed with pt's assistance. Pt assisted in removing appliance and cleansing peristomal area as well as stoma, pt attached pouch to skin barrier independently. Thick, dark green effluent in old pouch. Extra supplies at bedside for discharge. Reviewed previous teaching, pt to empty appliance independently today. 
Abdominal wound packing removed in order to demonstrate how to do dressing change for patient's mother. Site irrigated with NS flush then removed, 2x2s moistened and lightly packed into open site at midline incision. Some smaller open sites at lower end of incision which were also covered with moistened 2x2s. All sites covered with 4x4s and secured with paper tape. New 2 3/4" [place] 2 piece appliance placed with patient's mother observing. Stoma producing dark green liquid effluent. Wound care supplies and ostomy supplies provided for patient for discharge.

## 2022-08-12 NOTE — ADVANCED PRACTICE NURSE CONSULT - RECOMMEDATIONS
Will continue to follow
Will continue to follow
Will continue to follow for ostomy education
Will continue to follow. 
Will continue to follow

## 2022-08-12 NOTE — PROGRESS NOTE ADULT - SUBJECTIVE AND OBJECTIVE BOX
INTERVAL HPI/OVERNIGHT EVENTS:    Events reviewed  Nausea  CT pending    MEDICATIONS  (STANDING):  heparin   Injectable 5000 Unit(s) SubCutaneous every 8 hours  lactated ringers. 1000 milliLiter(s) (120 mL/Hr) IV Continuous <Continuous>  pantoprazole  Injectable 40 milliGRAM(s) IV Push daily    MEDICATIONS  (PRN):  acetaminophen     Tablet .. 650 milliGRAM(s) Oral every 6 hours PRN mild pain (1-3)  melatonin 5 milliGRAM(s) Oral at bedtime PRN Sleep  oxyCODONE    IR 5 milliGRAM(s) Oral every 6 hours PRN Severe Pain (7 - 10)  zolpidem 5 milliGRAM(s) Oral at bedtime PRN Insomnia      Allergies    No Known Allergies    Intolerances        Vital Signs Last 24 Hrs  T(C): 36.7 (12 Aug 2022 08:45), Max: 36.9 (11 Aug 2022 20:00)  T(F): 98 (12 Aug 2022 08:45), Max: 98.5 (11 Aug 2022 20:00)  HR: 52 (12 Aug 2022 08:45) (52 - 82)  BP: 137/82 (12 Aug 2022 08:45) (115/77 - 157/84)  BP(mean): --  RR: 17 (12 Aug 2022 08:45) (17 - 18)  SpO2: 95% (12 Aug 2022 08:45) (93% - 97%)    Parameters below as of 12 Aug 2022 08:45  Patient On (Oxygen Delivery Method): room air          LABS:                        12.5   5.92  )-----------( 333      ( 12 Aug 2022 08:34 )             36.0     08-12    136  |  102  |  6<L>  ----------------------------<  93  4.4   |  25  |  0.76    Ca    9.2      12 Aug 2022 08:34  Phos  3.6     08-12  Mg     2.0     08-12            MICROBIOLOGY:    RADIOLOGY & ADDITIONAL STUDIES: INTERVAL HPI/OVERNIGHT EVENTS:    Events reviewed  Nausea with advanced diet  S/P CT and PICC placement for TPN    MEDICATIONS  (STANDING):  heparin   Injectable 5000 Unit(s) SubCutaneous every 8 hours  lactated ringers. 1000 milliLiter(s) (120 mL/Hr) IV Continuous <Continuous>  pantoprazole  Injectable 40 milliGRAM(s) IV Push daily    MEDICATIONS  (PRN):  acetaminophen     Tablet .. 650 milliGRAM(s) Oral every 6 hours PRN mild pain (1-3)  melatonin 5 milliGRAM(s) Oral at bedtime PRN Sleep  oxyCODONE    IR 5 milliGRAM(s) Oral every 6 hours PRN Severe Pain (7 - 10)  zolpidem 5 milliGRAM(s) Oral at bedtime PRN Insomnia      Allergies    No Known Allergies    EXAM  Vital Signs Last 24 Hrs  T(C): 36.7 (12 Aug 2022 08:45), Max: 36.9 (11 Aug 2022 20:00)  T(F): 98 (12 Aug 2022 08:45), Max: 98.5 (11 Aug 2022 20:00)  HR: 52 (12 Aug 2022 08:45) (52 - 82)  BP: 137/82 (12 Aug 2022 08:45) (115/77 - 157/84)  BP(mean): --  RR: 17 (12 Aug 2022 08:45) (17 - 18)  SpO2: 95% (12 Aug 2022 08:45) (93% - 97%)    Parameters below as of 12 Aug 2022 08:45  Patient On (Oxygen Delivery Method): room air  Awake and alert  Nontoxic  No rash or icterus/jaundice  RRR  Chest CTA  Abd soft and NT now  There is some dark green ostomy output  LEs no edema        LABS:                        12.5   5.92  )-----------( 333      ( 12 Aug 2022 08:34 )             36.0     08-12    136  |  102  |  6<L>  ----------------------------<  93  4.4   |  25  |  0.76    Ca    9.2      12 Aug 2022 08:34  Phos  3.6     08-12  Mg     2.0     08-12        RADIOLOGY & ADDITIONAL STUDIES:    < from: CT Abdomen and Pelvis w/ Oral Cont and w/ IV Cont (08.12.22 @ 14:32) >  ACC: [id number] EXAM:  CT ABDOMEN AND PELVIS OC IC                          PROCEDURE DATE:  08/12/2022          INTERPRETATION:  CLINICAL INFORMATION: 42-year-old male status post   Robbins's procedure, postop day eight. Nausea. No ostomy output since   yesterday.    COMPARISON: CT scan of abdomen and pelvis from 8/4/2022.    CONTRAST/COMPLICATIONS:  IV Contrast: Isovue 370  90 cc administered   10 cc discarded  Oral Contrast: Omnipaque.  Complications: None reported at time of study completion    PROCEDURE:  CT of the Abdomen and Pelvis was performed.  Sagittal and coronal reformats were performed.    FINDINGS:  LOWER CHEST: Linear atelectasis left lower lobe.    LIVER: Within normal limits.  BILE DUCTS: Normal caliber.  GALLBLADDER: Withinnormal limits.  SPLEEN: Spleen is again enlarged, measuring 14.2 cm in greatest   dimension. Several splenic masses are again present, the largest   measuring 5.2 x 5.1 cm.  PANCREAS: Within normal limits.  ADRENALS: Within normal limits.  KIDNEYS/URETERS: Small bilateral renal cysts again present.    BLADDER: Small amount of gas within the bladder may be secondary to   recent instrumentation.  REPRODUCTIVE ORGANS: Prostate size within normal limits.    BOWEL: Interval resection of left colon with formation of descending   colostomy left midabdomen and Robbins's pouch. Appendix is normal. There   are several dilated, contrast-filled small bowel loops in the abdomen and   pelvis measuring up to 4.6 cm diameter. There is not one discrete   transition point, rather there are dilated loops of small bowel   alternating with thick-walled small bowel loops. There is angulation and   collapse of the distal ileum. The colon is also collapsed. No abscess or   free air.  PERITONEUM: No ascites.  VESSELS: Within normal limits.  RETROPERITONEUM/LYMPH NODES: No lymphadenopathy.  ABDOMINAL WALL: Postsurgical changes. Midline skin staples.  BONES: Mild degenerative disc disease L5-S1.    IMPRESSION:  1. Dilated proximal small bowel loops with collapsed distal small bowel   and colon. These findings could represent small bowel obstruction, but   there is not one discrete transition point. There could be enteritis,   given the presence of thick-walled small bowel loops, resulting in a   degree of bowel obstruction. Adhesions cannot be excluded given the   angulation of the distal small bowel. A repeat delayed CT scan could be   useful to determine if there is passage of contrast into the distal small   bowel and colon.    2. Interval left colonresection with Robbins's pouch and descending   colostomy.    3. Multiple masses in an enlarged spleen. Malignancy, such as lymphoma,   should be excluded. The differential diagnosis includes benign lesions.

## 2022-08-12 NOTE — PROGRESS NOTE ADULT - SUBJECTIVE AND OBJECTIVE BOX
Pt seen and examined   nausea when diet advanced  c/o small output from ostomy    REVIEW OF SYSTEMS:  Constitutional: No fever, weight loss or fatigue  Cardiovascular: No chest pain, palpitations, dizziness   Gastrointestinal: No abdominal or epigastric pain. + nausea, no vomiting   Skin: No itching, burning, rashes or lesions       MEDICATIONS:  MEDICATIONS  (STANDING):  diatrizoate meglumine/diatrizoate sodium. 30 milliLiter(s) Oral once  heparin   Injectable 5000 Unit(s) SubCutaneous every 8 hours  iohexol 300 mG (iodine)/mL Oral Solution 30 milliLiter(s) Oral once  lactated ringers. 1000 milliLiter(s) (120 mL/Hr) IV Continuous <Continuous>  pantoprazole  Injectable 40 milliGRAM(s) IV Push daily    MEDICATIONS  (PRN):  acetaminophen     Tablet .. 650 milliGRAM(s) Oral every 6 hours PRN mild pain (1-3)  melatonin 5 milliGRAM(s) Oral at bedtime PRN Sleep  oxyCODONE    IR 5 milliGRAM(s) Oral every 6 hours PRN Severe Pain (7 - 10)  zolpidem 5 milliGRAM(s) Oral at bedtime PRN Insomnia      Allergies    No Known Allergies    Intolerances        Vital Signs Last 24 Hrs  T(C): 36.7 (12 Aug 2022 08:45), Max: 36.9 (11 Aug 2022 20:00)  T(F): 98 (12 Aug 2022 08:45), Max: 98.5 (11 Aug 2022 20:00)  HR: 52 (12 Aug 2022 08:45) (52 - 82)  BP: 137/82 (12 Aug 2022 08:45) (115/77 - 157/84)  BP(mean): --  RR: 17 (12 Aug 2022 08:45) (17 - 18)  SpO2: 95% (12 Aug 2022 08:45) (93% - 97%)    Parameters below as of 12 Aug 2022 08:45  Patient On (Oxygen Delivery Method): room air        08-11 @ 07:01  -  08-12 @ 07:00  --------------------------------------------------------  IN: 4430 mL / OUT: 3315 mL / NET: 1115 mL        PHYSICAL EXAM:    General:  in no acute distress  HEENT: MMM, conjunctiva and sclera clear  Lungs: clear  Heart: regular  Gastrointestinal: Soft non-tender non-distended; Normal bowel sounds; ostomy looks good  Skin: Warm and dry. No obvious rash    LABS:      CBC Full  -  ( 12 Aug 2022 08:34 )  WBC Count : 5.92 K/uL  RBC Count : 3.98 M/uL  Hemoglobin : 12.5 g/dL  Hematocrit : 36.0 %  Platelet Count - Automated : 333 K/uL  Mean Cell Volume : 90.5 fl  Mean Cell Hemoglobin : 31.4 pg  Mean Cell Hemoglobin Concentration : 34.7 gm/dL  Auto Neutrophil # : x  Auto Lymphocyte # : x  Auto Monocyte # : x  Auto Eosinophil # : x  Auto Basophil # : x  Auto Neutrophil % : x  Auto Lymphocyte % : x  Auto Monocyte % : x  Auto Eosinophil % : x  Auto Basophil % : x    08-12    136  |  102  |  6<L>  ----------------------------<  93  4.4   |  25  |  0.76    Ca    9.2      12 Aug 2022 08:34  Phos  3.6     08-12  Mg     2.0     08-12                        RADIOLOGY & ADDITIONAL STUDIES (The following images were personally reviewed):

## 2022-08-12 NOTE — ADVANCED PRACTICE NURSE CONSULT - REASON FOR CONSULT
ostomy and wound dressing education 
ostomy teaching
ostomy assessment/education
ostomy education
ostomy teaching

## 2022-08-12 NOTE — PROGRESS NOTE ADULT - ASSESSMENT
42 M PMH of uncomplicated diverticulitis x3 (last episode 6 months ago) presents with acute complicated diverticulitis with focal perforation, on IV antibiotics now s/p Hartmans procedure on 8/4. c/b post-operative ileus.     - Abx: zosyn (8/4 - )  - f/u CT abd pelvis w/ po + iv contrast  - LRD  - possible picc later today   - Pain and Nausea control  - SCD/SQH  - ostomy consult  - IS/OOB  - AM labs  - PT recs: outpatient PT   - outpt MRI for 5cm splenic mass

## 2022-08-12 NOTE — PROGRESS NOTE ADULT - SUBJECTIVE AND OBJECTIVE BOX
STATUS POST:  8/4 Hartmans procedure       POST OPERATIVE DAY #: 8    SUBJECTIVE: Pt seen and examined at bedside this am by surgery team. Overnight, pt complained of mild abdominal discomfort and nausea after eating a few bites of low fiber diet, which resolved spontaneously without vomiting.  This morning, he feels well; his pain is well-controlled. No nausea or vomiting. Passing flatus and having BMs. No acute complaints.  Denies f/cp/sob.    MEDICATIONS  (STANDING):  heparin   Injectable 5000 Unit(s) SubCutaneous every 8 hours  iohexol 300 mG (iodine)/mL Oral Solution 30 milliLiter(s) Oral once  lactated ringers. 1000 milliLiter(s) (120 mL/Hr) IV Continuous <Continuous>  pantoprazole  Injectable 40 milliGRAM(s) IV Push daily    MEDICATIONS  (PRN):  acetaminophen     Tablet .. 650 milliGRAM(s) Oral every 6 hours PRN mild pain (1-3)  melatonin 5 milliGRAM(s) Oral at bedtime PRN Sleep  oxyCODONE    IR 5 milliGRAM(s) Oral every 6 hours PRN Severe Pain (7 - 10)  zolpidem 5 milliGRAM(s) Oral at bedtime PRN Insomnia      Vital Signs Last 24 Hrs  T(C): 36.7 (12 Aug 2022 04:57), Max: 37.1 (11 Aug 2022 08:15)  T(F): 98 (12 Aug 2022 04:57), Max: 98.7 (11 Aug 2022 08:15)  HR: 82 (12 Aug 2022 04:57) (56 - 82)  BP: 115/77 (12 Aug 2022 04:57) (115/77 - 157/84)  BP(mean): --  RR: 18 (12 Aug 2022 04:57) (18 - 18)  SpO2: 96% (12 Aug 2022 04:57) (93% - 98%)    Parameters below as of 11 Aug 2022 20:00  Patient On (Oxygen Delivery Method): room air        PHYSICAL EXAM:      Constitutional: A&Ox3    Breasts:    Respiratory: non labored breathing, no respiratory distress    Cardiovascular: NSR, RRR    Gastrointestinal:                 Incision:    Genitourinary:    Extremities: (-) edema                  I&O's Detail    11 Aug 2022 07:01  -  12 Aug 2022 07:00  --------------------------------------------------------  IN:    IV PiggyBack: 200 mL    Lactated Ringers: 2880 mL    Oral Fluid: 1350 mL  Total IN: 4430 mL    OUT:    Colostomy (mL): 65 mL    Voided (mL): 3250 mL  Total OUT: 3315 mL    Total NET: 1115 mL          LABS:                        11.5   6.19  )-----------( 307      ( 11 Aug 2022 05:11 )             33.3     08-11    136  |  99  |  8   ----------------------------<  79  3.9   |  24  |  0.74    Ca    9.0      11 Aug 2022 05:11  Phos  3.8     08-11  Mg     1.9     08-11            RADIOLOGY & ADDITIONAL STUDIES:   STATUS POST:  8/4 Hartmans procedure       POST OPERATIVE DAY #: 8    SUBJECTIVE: Pt seen and examined at bedside this am by surgery team. Overnight, pt complained of mild abdominal discomfort and nausea after eating a few bites of low fiber diet, which resolved spontaneously without vomiting.  This morning, he feels improved; his pain is well-controlled. No nausea or vomiting this morning. Ostomy bag without output. No acute complaints.  Denies f/cp/sob.    Vital Signs Last 24 Hrs  T(C): 36.7 (12 Aug 2022 04:57), Max: 37.1 (11 Aug 2022 08:15)  T(F): 98 (12 Aug 2022 04:57), Max: 98.7 (11 Aug 2022 08:15)  HR: 82 (12 Aug 2022 04:57) (56 - 82)  BP: 115/77 (12 Aug 2022 04:57) (115/77 - 157/84)  BP(mean): --  RR: 18 (12 Aug 2022 04:57) (18 - 18)  SpO2: 96% (12 Aug 2022 04:57) (93% - 98%)    Parameters below as of 11 Aug 2022 20:00  Patient On (Oxygen Delivery Method): room air        PHYSICAL EXAM:  Constitutional: Awake, alert, NAD, resting comfortably   Respiratory: non labored breathing, no respiratory distress  Cardiovascular: NSR, RRR  Gastrointestinal: soft, appropriately tender, mild distension, no rebound, no guarding, midline incision c/d/i, dressing changed, L sided ostomy with no output   Extremities: (-) edema, wwp             I&O's Detail    11 Aug 2022 07:01  -  12 Aug 2022 07:00  --------------------------------------------------------  IN:    IV PiggyBack: 200 mL    Lactated Ringers: 2880 mL    Oral Fluid: 1350 mL  Total IN: 4430 mL    OUT:    Colostomy (mL): 65 mL    Voided (mL): 3250 mL  Total OUT: 3315 mL    Total NET: 1115 mL          LABS:                        11.5   6.19  )-----------( 307      ( 11 Aug 2022 05:11 )             33.3     08-11    136  |  99  |  8   ----------------------------<  79  3.9   |  24  |  0.74    Ca    9.0      11 Aug 2022 05:11  Phos  3.8     08-11  Mg     1.9     08-11            RADIOLOGY & ADDITIONAL STUDIES: no

## 2022-08-13 LAB
ANION GAP SERPL CALC-SCNC: 11 MMOL/L — SIGNIFICANT CHANGE UP (ref 5–17)
BUN SERPL-MCNC: 6 MG/DL — LOW (ref 7–23)
CALCIUM SERPL-MCNC: 9.1 MG/DL — SIGNIFICANT CHANGE UP (ref 8.4–10.5)
CHLORIDE SERPL-SCNC: 100 MMOL/L — SIGNIFICANT CHANGE UP (ref 96–108)
CO2 SERPL-SCNC: 26 MMOL/L — SIGNIFICANT CHANGE UP (ref 22–31)
CREAT SERPL-MCNC: 0.69 MG/DL — SIGNIFICANT CHANGE UP (ref 0.5–1.3)
EGFR: 118 ML/MIN/1.73M2 — SIGNIFICANT CHANGE UP
GLUCOSE SERPL-MCNC: 93 MG/DL — SIGNIFICANT CHANGE UP (ref 70–99)
HCT VFR BLD CALC: 35.5 % — LOW (ref 39–50)
HGB BLD-MCNC: 11.9 G/DL — LOW (ref 13–17)
MAGNESIUM SERPL-MCNC: 2 MG/DL — SIGNIFICANT CHANGE UP (ref 1.6–2.6)
MCHC RBC-ENTMCNC: 30.8 PG — SIGNIFICANT CHANGE UP (ref 27–34)
MCHC RBC-ENTMCNC: 33.5 GM/DL — SIGNIFICANT CHANGE UP (ref 32–36)
MCV RBC AUTO: 92 FL — SIGNIFICANT CHANGE UP (ref 80–100)
NRBC # BLD: 0 /100 WBCS — SIGNIFICANT CHANGE UP (ref 0–0)
PHOSPHATE SERPL-MCNC: 3.6 MG/DL — SIGNIFICANT CHANGE UP (ref 2.5–4.5)
PLATELET # BLD AUTO: 317 K/UL — SIGNIFICANT CHANGE UP (ref 150–400)
POTASSIUM SERPL-MCNC: 4.3 MMOL/L — SIGNIFICANT CHANGE UP (ref 3.5–5.3)
POTASSIUM SERPL-SCNC: 4.3 MMOL/L — SIGNIFICANT CHANGE UP (ref 3.5–5.3)
RBC # BLD: 3.86 M/UL — LOW (ref 4.2–5.8)
RBC # FLD: 12.1 % — SIGNIFICANT CHANGE UP (ref 10.3–14.5)
SODIUM SERPL-SCNC: 137 MMOL/L — SIGNIFICANT CHANGE UP (ref 135–145)
WBC # BLD: 5.1 K/UL — SIGNIFICANT CHANGE UP (ref 3.8–10.5)
WBC # FLD AUTO: 5.1 K/UL — SIGNIFICANT CHANGE UP (ref 3.8–10.5)

## 2022-08-13 RX ORDER — PIPERACILLIN AND TAZOBACTAM 4; .5 G/20ML; G/20ML
3.38 INJECTION, POWDER, LYOPHILIZED, FOR SOLUTION INTRAVENOUS EVERY 6 HOURS
Refills: 0 | Status: DISCONTINUED | OUTPATIENT
Start: 2022-08-13 | End: 2022-08-15

## 2022-08-13 RX ORDER — PIPERACILLIN AND TAZOBACTAM 4; .5 G/20ML; G/20ML
3.38 INJECTION, POWDER, LYOPHILIZED, FOR SOLUTION INTRAVENOUS ONCE
Refills: 0 | Status: COMPLETED | OUTPATIENT
Start: 2022-08-13 | End: 2022-08-13

## 2022-08-13 RX ADMIN — PIPERACILLIN AND TAZOBACTAM 200 GRAM(S): 4; .5 INJECTION, POWDER, LYOPHILIZED, FOR SOLUTION INTRAVENOUS at 18:14

## 2022-08-13 RX ADMIN — PANTOPRAZOLE SODIUM 40 MILLIGRAM(S): 20 TABLET, DELAYED RELEASE ORAL at 11:40

## 2022-08-13 RX ADMIN — HEPARIN SODIUM 5000 UNIT(S): 5000 INJECTION INTRAVENOUS; SUBCUTANEOUS at 14:46

## 2022-08-13 RX ADMIN — HEPARIN SODIUM 5000 UNIT(S): 5000 INJECTION INTRAVENOUS; SUBCUTANEOUS at 21:55

## 2022-08-13 RX ADMIN — SODIUM CHLORIDE 120 MILLILITER(S): 9 INJECTION, SOLUTION INTRAVENOUS at 11:40

## 2022-08-13 RX ADMIN — HEPARIN SODIUM 5000 UNIT(S): 5000 INJECTION INTRAVENOUS; SUBCUTANEOUS at 06:36

## 2022-08-13 NOTE — PROGRESS NOTE ADULT - SUBJECTIVE AND OBJECTIVE BOX
INTERVAL HPI/OVERNIGHT EVENTS:    ANTIBIOTICS/RELEVANT:    MEDICATIONS  (STANDING):  heparin   Injectable 5000 Unit(s) SubCutaneous every 8 hours  lactated ringers. 1000 milliLiter(s) (120 mL/Hr) IV Continuous <Continuous>  pantoprazole  Injectable 40 milliGRAM(s) IV Push daily    MEDICATIONS  (PRN):  acetaminophen     Tablet .. 650 milliGRAM(s) Oral every 6 hours PRN mild pain (1-3)  melatonin 5 milliGRAM(s) Oral at bedtime PRN Sleep  oxyCODONE    IR 5 milliGRAM(s) Oral every 6 hours PRN Severe Pain (7 - 10)  zolpidem 5 milliGRAM(s) Oral at bedtime PRN Insomnia      Allergies    No Known Allergies        Vital Signs Last 24 Hrs  T(C): 36.8 (13 Aug 2022 13:40), Max: 37.2 (12 Aug 2022 23:54)  T(F): 98.3 (13 Aug 2022 13:40), Max: 99 (12 Aug 2022 23:54)  HR: 78 (13 Aug 2022 13:40) (60 - 83)  BP: 114/78 (13 Aug 2022 13:40) (105/74 - 143/80)  BP(mean): --  RR: 18 (13 Aug 2022 13:40) (17 - 18)  SpO2: 99% (13 Aug 2022 13:40) (95% - 99%)    Parameters below as of 13 Aug 2022 13:40  Patient On (Oxygen Delivery Method): room air          LABS:                        11.9   5.10  )-----------( 317      ( 13 Aug 2022 05:30 )             35.5     08-13    137  |  100  |  6<L>  ----------------------------<  93  4.3   |  26  |  0.69    Ca    9.1      13 Aug 2022 05:30  Phos  3.6     08-13  Mg     2.0     08-13            MICROBIOLOGY:    RADIOLOGY & ADDITIONAL STUDIES: INTERVAL HPI/OVERNIGHT EVENTS:    TPN not started  Patient on liquid diet      MEDICATIONS  (STANDING):  heparin   Injectable 5000 Unit(s) SubCutaneous every 8 hours  lactated ringers. 1000 milliLiter(s) (120 mL/Hr) IV Continuous <Continuous>  pantoprazole  Injectable 40 milliGRAM(s) IV Push daily    MEDICATIONS  (PRN):  acetaminophen     Tablet .. 650 milliGRAM(s) Oral every 6 hours PRN mild pain (1-3)  melatonin 5 milliGRAM(s) Oral at bedtime PRN Sleep  oxyCODONE    IR 5 milliGRAM(s) Oral every 6 hours PRN Severe Pain (7 - 10)  zolpidem 5 milliGRAM(s) Oral at bedtime PRN Insomnia      Allergies  NKDA    EXAM  Vital Signs Last 24 Hrs  T(C): 36.8 (13 Aug 2022 13:40), Max: 37.2 (12 Aug 2022 23:54)  T(F): 98.3 (13 Aug 2022 13:40), Max: 99 (12 Aug 2022 23:54)  HR: 78 (13 Aug 2022 13:40) (60 - 83)  BP: 114/78 (13 Aug 2022 13:40) (105/74 - 143/80)  BP(mean): --  RR: 18 (13 Aug 2022 13:40) (17 - 18)  SpO2: 99% (13 Aug 2022 13:40) (95% - 99%)    Parameters below as of 13 Aug 2022 13:40  Patient On (Oxygen Delivery Method): room air  Ambulating in the hallway  No distress  PICC site OK  IVF running  No rash  RRR  Chest CTA  Abd softly distended without tenderness.  Ostomy output  LEs no edema        LABS:                        11.9   5.10  )-----------( 317      ( 13 Aug 2022 05:30 )             35.5     08-13    137  |  100  |  6<L>  ----------------------------<  93  4.3   |  26  |  0.69    Ca    9.1      13 Aug 2022 05:30  Phos  3.6     08-13  Mg     2.0     08-13

## 2022-08-13 NOTE — PROGRESS NOTE ADULT - SUBJECTIVE AND OBJECTIVE BOX
STATUS POST:  8/4: Hartmans procedure     POST OPERATIVE DAY #: 9    SUBJECTIVE: Pt seen and examined at bedside this am by surgery team. Tolerating CLD, -n/-v, has ostomy output, asking to be advanced to Low fiber diet. Pain well controlled. Denies f/n/v/cp/sob.  Picc placed yesterday 8/12 for possible TPN.     Vital Signs Last 24 Hrs  T(C): 36.8 (13 Aug 2022 08:05), Max: 37.2 (12 Aug 2022 23:54)  T(F): 98.2 (13 Aug 2022 08:05), Max: 99 (12 Aug 2022 23:54)  HR: 60 (13 Aug 2022 08:05) (60 - 83)  BP: 105/74 (13 Aug 2022 08:05) (105/74 - 143/80)  BP(mean): --  RR: 17 (13 Aug 2022 08:05) (17 - 18)  SpO2: 97% (13 Aug 2022 08:05) (95% - 98%)    Parameters below as of 13 Aug 2022 08:05  Patient On (Oxygen Delivery Method): room air        PHYSICAL EXAM:  Constitutional: Awake, alert, NAD, resting comfortably   Respiratory: non labored breathing, no respiratory distress  Cardiovascular: NSR, RRR  Gastrointestinal: soft, appropriately tender, non distended, no rebound, no guarding, ostomy with output, midline dressing c/d/i and changed during rounds   Extremities: (-) edema, wwp                   I&O's Detail    12 Aug 2022 07:01  -  13 Aug 2022 07:00  --------------------------------------------------------  IN:    Lactated Ringers: 1980 mL    Oral Fluid: 360 mL  Total IN: 2340 mL    OUT:    Colostomy (mL): 425 mL    Voided (mL): 1700 mL  Total OUT: 2125 mL    Total NET: 215 mL          LABS:                        11.9   5.10  )-----------( 317      ( 13 Aug 2022 05:30 )             35.5     08-13    137  |  100  |  6<L>  ----------------------------<  93  4.3   |  26  |  0.69    Ca    9.1      13 Aug 2022 05:30  Phos  3.6     08-13  Mg     2.0     08-13            RADIOLOGY & ADDITIONAL STUDIES:

## 2022-08-13 NOTE — PROGRESS NOTE ADULT - ASSESSMENT
Perforated diverticulitis  S/P Sabas's procedure  Ileus   S/P Knee surgery  Splenic lesions       RECOMMEND  Resume / Continue Pip-Tazo   Work up splenic lesions        Discussed with team 5 surgery    [phone]

## 2022-08-13 NOTE — PROGRESS NOTE ADULT - ASSESSMENT
42 M PMH of uncomplicated diverticulitis x3 (last episode 6 months ago) presents with acute complicated diverticulitis with focal perforation, on IV antibiotics now s/p Hartmans procedure on 8/4. c/b post-operative ileus.     - Abx: zosyn (8/4 - )  - full liquid diet  - Pain and Nausea control  - SCD/SQH  - ostomy consult  - IS/OOB  - AM labs  - PT recs: outpatient PT   - outpt MRI for 5cm splenic mass

## 2022-08-13 NOTE — PROGRESS NOTE ADULT - SUBJECTIVE AND OBJECTIVE BOX
Pt seen and examined   lots of output from colostomy and now feels better  CT noted    REVIEW OF SYSTEMS:  Constitutional: No fever, +weight loss   Cardiovascular: No chest pain, palpitations, dizziness  Gastrointestinal: No abdominal or epigastric pain. No nausea, vomiting ;. No melena or hematochezia.  Skin: No itching, burning, rashes or lesions       MEDICATIONS:  MEDICATIONS  (STANDING):  heparin   Injectable 5000 Unit(s) SubCutaneous every 8 hours  lactated ringers. 1000 milliLiter(s) (120 mL/Hr) IV Continuous <Continuous>  pantoprazole  Injectable 40 milliGRAM(s) IV Push daily    MEDICATIONS  (PRN):  acetaminophen     Tablet .. 650 milliGRAM(s) Oral every 6 hours PRN mild pain (1-3)  melatonin 5 milliGRAM(s) Oral at bedtime PRN Sleep  oxyCODONE    IR 5 milliGRAM(s) Oral every 6 hours PRN Severe Pain (7 - 10)  zolpidem 5 milliGRAM(s) Oral at bedtime PRN Insomnia      Allergies    No Known Allergies    Intolerances        Vital Signs Last 24 Hrs  T(C): 36.8 (13 Aug 2022 08:05), Max: 37.2 (12 Aug 2022 23:54)  T(F): 98.2 (13 Aug 2022 08:05), Max: 99 (12 Aug 2022 23:54)  HR: 60 (13 Aug 2022 08:05) (60 - 83)  BP: 105/74 (13 Aug 2022 08:05) (105/74 - 143/80)  BP(mean): --  RR: 17 (13 Aug 2022 08:05) (17 - 18)  SpO2: 97% (13 Aug 2022 08:05) (95% - 98%)    Parameters below as of 13 Aug 2022 08:05  Patient On (Oxygen Delivery Method): room air        08-12 @ 07:01  -  08-13 @ 07:00  --------------------------------------------------------  IN: 2340 mL / OUT: 2125 mL / NET: 215 mL        PHYSICAL EXAM:    General:  in no acute distress  HEENT: MMM, conjunctiva and sclera clear  Lungs: clear  Heart: regular  Gastrointestinal: Soft non-tender non-distended; Normal bowel sounds; ostomy with good function  Skin: Warm and dry. No obvious rash  Ext: no edema    LABS:      CBC Full  -  ( 13 Aug 2022 05:30 )  WBC Count : 5.10 K/uL  RBC Count : 3.86 M/uL  Hemoglobin : 11.9 g/dL  Hematocrit : 35.5 %  Platelet Count - Automated : 317 K/uL  Mean Cell Volume : 92.0 fl  Mean Cell Hemoglobin : 30.8 pg  Mean Cell Hemoglobin Concentration : 33.5 gm/dL  Auto Neutrophil # : x  Auto Lymphocyte # : x  Auto Monocyte # : x  Auto Eosinophil # : x  Auto Basophil # : x  Auto Neutrophil % : x  Auto Lymphocyte % : x  Auto Monocyte % : x  Auto Eosinophil % : x  Auto Basophil % : x    08-13    137  |  100  |  6<L>  ----------------------------<  93  4.3   |  26  |  0.69    Ca    9.1      13 Aug 2022 05:30  Phos  3.6     08-13  Mg     2.0     08-13                        RADIOLOGY & ADDITIONAL STUDIES (The following images were personally reviewed):

## 2022-08-13 NOTE — PROGRESS NOTE ADULT - ASSESSMENT
diet per surgical team  antibiotics per ID  per Radiology suggest biopsy of spleen lesion to rule out lymphoma

## 2022-08-14 LAB
ANION GAP SERPL CALC-SCNC: 10 MMOL/L — SIGNIFICANT CHANGE UP (ref 5–17)
BUN SERPL-MCNC: 5 MG/DL — LOW (ref 7–23)
CALCIUM SERPL-MCNC: 9 MG/DL — SIGNIFICANT CHANGE UP (ref 8.4–10.5)
CHLORIDE SERPL-SCNC: 101 MMOL/L — SIGNIFICANT CHANGE UP (ref 96–108)
CO2 SERPL-SCNC: 26 MMOL/L — SIGNIFICANT CHANGE UP (ref 22–31)
CREAT SERPL-MCNC: 0.93 MG/DL — SIGNIFICANT CHANGE UP (ref 0.5–1.3)
EGFR: 105 ML/MIN/1.73M2 — SIGNIFICANT CHANGE UP
GLUCOSE SERPL-MCNC: 105 MG/DL — HIGH (ref 70–99)
HCT VFR BLD CALC: 37.1 % — LOW (ref 39–50)
HGB BLD-MCNC: 12.3 G/DL — LOW (ref 13–17)
MAGNESIUM SERPL-MCNC: 1.9 MG/DL — SIGNIFICANT CHANGE UP (ref 1.6–2.6)
MCHC RBC-ENTMCNC: 30.5 PG — SIGNIFICANT CHANGE UP (ref 27–34)
MCHC RBC-ENTMCNC: 33.2 GM/DL — SIGNIFICANT CHANGE UP (ref 32–36)
MCV RBC AUTO: 92.1 FL — SIGNIFICANT CHANGE UP (ref 80–100)
NRBC # BLD: 0 /100 WBCS — SIGNIFICANT CHANGE UP (ref 0–0)
PHOSPHATE SERPL-MCNC: 4.3 MG/DL — SIGNIFICANT CHANGE UP (ref 2.5–4.5)
PLATELET # BLD AUTO: 320 K/UL — SIGNIFICANT CHANGE UP (ref 150–400)
POTASSIUM SERPL-MCNC: 4.1 MMOL/L — SIGNIFICANT CHANGE UP (ref 3.5–5.3)
POTASSIUM SERPL-SCNC: 4.1 MMOL/L — SIGNIFICANT CHANGE UP (ref 3.5–5.3)
RBC # BLD: 4.03 M/UL — LOW (ref 4.2–5.8)
RBC # FLD: 12 % — SIGNIFICANT CHANGE UP (ref 10.3–14.5)
SODIUM SERPL-SCNC: 137 MMOL/L — SIGNIFICANT CHANGE UP (ref 135–145)
WBC # BLD: 8.17 K/UL — SIGNIFICANT CHANGE UP (ref 3.8–10.5)
WBC # FLD AUTO: 8.17 K/UL — SIGNIFICANT CHANGE UP (ref 3.8–10.5)

## 2022-08-14 RX ORDER — ACETAMINOPHEN 500 MG
1000 TABLET ORAL ONCE
Refills: 0 | Status: COMPLETED | OUTPATIENT
Start: 2022-08-14 | End: 2022-08-14

## 2022-08-14 RX ORDER — ELECTROLYTE SOLUTION,INJ
1 VIAL (ML) INTRAVENOUS
Refills: 0 | Status: DISCONTINUED | OUTPATIENT
Start: 2022-08-14 | End: 2022-08-15

## 2022-08-14 RX ORDER — MAGNESIUM SULFATE 500 MG/ML
1 VIAL (ML) INJECTION ONCE
Refills: 0 | Status: COMPLETED | OUTPATIENT
Start: 2022-08-14 | End: 2022-08-14

## 2022-08-14 RX ADMIN — Medication 400 MILLIGRAM(S): at 11:23

## 2022-08-14 RX ADMIN — HEPARIN SODIUM 5000 UNIT(S): 5000 INJECTION INTRAVENOUS; SUBCUTANEOUS at 05:18

## 2022-08-14 RX ADMIN — Medication 100 GRAM(S): at 09:23

## 2022-08-14 RX ADMIN — Medication 1000 MILLIGRAM(S): at 11:38

## 2022-08-14 RX ADMIN — PIPERACILLIN AND TAZOBACTAM 200 GRAM(S): 4; .5 INJECTION, POWDER, LYOPHILIZED, FOR SOLUTION INTRAVENOUS at 11:08

## 2022-08-14 RX ADMIN — PIPERACILLIN AND TAZOBACTAM 200 GRAM(S): 4; .5 INJECTION, POWDER, LYOPHILIZED, FOR SOLUTION INTRAVENOUS at 17:39

## 2022-08-14 RX ADMIN — PIPERACILLIN AND TAZOBACTAM 200 GRAM(S): 4; .5 INJECTION, POWDER, LYOPHILIZED, FOR SOLUTION INTRAVENOUS at 23:07

## 2022-08-14 RX ADMIN — PIPERACILLIN AND TAZOBACTAM 200 GRAM(S): 4; .5 INJECTION, POWDER, LYOPHILIZED, FOR SOLUTION INTRAVENOUS at 05:17

## 2022-08-14 RX ADMIN — PIPERACILLIN AND TAZOBACTAM 200 GRAM(S): 4; .5 INJECTION, POWDER, LYOPHILIZED, FOR SOLUTION INTRAVENOUS at 00:07

## 2022-08-14 RX ADMIN — Medication 1 EACH: at 17:39

## 2022-08-14 RX ADMIN — HEPARIN SODIUM 5000 UNIT(S): 5000 INJECTION INTRAVENOUS; SUBCUTANEOUS at 23:08

## 2022-08-14 RX ADMIN — PANTOPRAZOLE SODIUM 40 MILLIGRAM(S): 20 TABLET, DELAYED RELEASE ORAL at 11:09

## 2022-08-14 RX ADMIN — HEPARIN SODIUM 5000 UNIT(S): 5000 INJECTION INTRAVENOUS; SUBCUTANEOUS at 14:20

## 2022-08-14 NOTE — PROGRESS NOTE ADULT - SUBJECTIVE AND OBJECTIVE BOX
INTERVAL HPI/OVERNIGHT EVENTS:    ANTIBIOTICS/RELEVANT:    MEDICATIONS  (STANDING):  heparin   Injectable 5000 Unit(s) SubCutaneous every 8 hours  pantoprazole  Injectable 40 milliGRAM(s) IV Push daily  Parenteral Nutrition - Adult 1 Each (50 mL/Hr) TPN Continuous <Continuous>  piperacillin/tazobactam IVPB.. 3.375 Gram(s) IV Intermittent every 6 hours    MEDICATIONS  (PRN):  acetaminophen     Tablet .. 650 milliGRAM(s) Oral every 6 hours PRN mild pain (1-3)  melatonin 5 milliGRAM(s) Oral at bedtime PRN Sleep  oxyCODONE    IR 5 milliGRAM(s) Oral every 6 hours PRN Severe Pain (7 - 10)  zolpidem 5 milliGRAM(s) Oral at bedtime PRN Insomnia      Allergies    No Known Allergies    Intolerances        Vital Signs Last 24 Hrs  T(C): 36.6 (14 Aug 2022 12:23), Max: 36.9 (14 Aug 2022 05:44)  T(F): 97.9 (14 Aug 2022 12:23), Max: 98.5 (14 Aug 2022 05:44)  HR: 104 (14 Aug 2022 12:23) (75 - 104)  BP: 127/86 (14 Aug 2022 12:23) (114/69 - 134/83)  BP(mean): --  RR: 18 (14 Aug 2022 12:23) (17 - 18)  SpO2: 97% (14 Aug 2022 12:23) (97% - 99%)    Parameters below as of 14 Aug 2022 12:23  Patient On (Oxygen Delivery Method): room air              LABS:                        12.3   8.17  )-----------( 320      ( 14 Aug 2022 07:35 )             37.1     08-14    137  |  101  |  5<L>  ----------------------------<  105<H>  4.1   |  26  |  0.93    Ca    9.0      14 Aug 2022 07:35  Phos  4.3     08-14  Mg     1.9     08-14            MICROBIOLOGY:    RADIOLOGY & ADDITIONAL STUDIES: INTERVAL HPI/OVERNIGHT EVENTS:    Patient started eating solid food  No TPN started    MEDICATIONS  (STANDING):  heparin   Injectable 5000 Unit(s) SubCutaneous every 8 hours  pantoprazole  Injectable 40 milliGRAM(s) IV Push daily  Parenteral Nutrition - Adult 1 Each (50 mL/Hr) TPN Continuous <Continuous>  piperacillin/tazobactam IVPB.. 3.375 Gram(s) IV Intermittent every 6 hours    MEDICATIONS  (PRN):  acetaminophen     Tablet .. 650 milliGRAM(s) Oral every 6 hours PRN mild pain (1-3)  melatonin 5 milliGRAM(s) Oral at bedtime PRN Sleep  oxyCODONE    IR 5 milliGRAM(s) Oral every 6 hours PRN Severe Pain (7 - 10)  zolpidem 5 milliGRAM(s) Oral at bedtime PRN Insomnia      Allergies    No Known Allergies      EXAM  Vital Signs Last 24 Hrs  T(C): 36.6 (14 Aug 2022 12:23), Max: 36.9 (14 Aug 2022 05:44)  T(F): 97.9 (14 Aug 2022 12:23), Max: 98.5 (14 Aug 2022 05:44)  HR: 104 (14 Aug 2022 12:23) (75 - 104)  BP: 127/86 (14 Aug 2022 12:23) (114/69 - 134/83)  BP(mean): --  RR: 18 (14 Aug 2022 12:23) (17 - 18)  SpO2: 97% (14 Aug 2022 12:23) (97% - 99%)    Parameters below as of 14 Aug 2022 12:23  Patient On (Oxygen Delivery Method): room air    Awake and alert  Comfortable appearing  No rash  RRR  Chest CTA  Abd softly distended - a little more than yesterday.  Pasty output via ostomy  LEs no edema  PICC site nontender and without erythema      LABS:                        12.3   8.17  )-----------( 320      ( 14 Aug 2022 07:35 )             37.1     08-14    137  |  101  |  5<L>  ----------------------------<  105<H>  4.1   |  26  |  0.93    Ca    9.0      14 Aug 2022 07:35  Phos  4.3     08-14  Mg     1.9     08-14

## 2022-08-14 NOTE — PROGRESS NOTE ADULT - SUBJECTIVE AND OBJECTIVE BOX
Pt seen and examined   LLQ pain  receiving IV tylenol  no nausea  tolerating diet    REVIEW OF SYSTEMS:  Constitutional: No fever, +weight loss no fatigue  Cardiovascular: No chest pain, palpitations, dizziness or leg swelling  Resp: no sob  Gastrointestinal: LLQ  pain. No nausea, vomiting or hematemesis;   Skin: No itching, burning, rashes or lesions       MEDICATIONS:  MEDICATIONS  (STANDING):  heparin   Injectable 5000 Unit(s) SubCutaneous every 8 hours  pantoprazole  Injectable 40 milliGRAM(s) IV Push daily  Parenteral Nutrition - Adult 1 Each (50 mL/Hr) TPN Continuous <Continuous>  piperacillin/tazobactam IVPB.. 3.375 Gram(s) IV Intermittent every 6 hours    MEDICATIONS  (PRN):  acetaminophen     Tablet .. 650 milliGRAM(s) Oral every 6 hours PRN mild pain (1-3)  melatonin 5 milliGRAM(s) Oral at bedtime PRN Sleep  oxyCODONE    IR 5 milliGRAM(s) Oral every 6 hours PRN Severe Pain (7 - 10)  zolpidem 5 milliGRAM(s) Oral at bedtime PRN Insomnia      Allergies    No Known Allergies    Intolerances        Vital Signs Last 24 Hrs  T(C): 36.9 (14 Aug 2022 08:05), Max: 36.9 (14 Aug 2022 05:44)  T(F): 98.5 (14 Aug 2022 08:05), Max: 98.5 (14 Aug 2022 05:44)  HR: 75 (14 Aug 2022 08:05) (75 - 92)  BP: 134/83 (14 Aug 2022 08:05) (114/69 - 134/83)  BP(mean): --  RR: 17 (14 Aug 2022 08:05) (17 - 18)  SpO2: 97% (14 Aug 2022 08:05) (97% - 99%)    Parameters below as of 14 Aug 2022 08:05  Patient On (Oxygen Delivery Method): room air        08-13 @ 07:01  -  08-14 @ 07:00  --------------------------------------------------------  IN: 1820 mL / OUT: 600 mL / NET: 1220 mL    08-14 @ 07:01  -  08-14 @ 11:50  --------------------------------------------------------  IN: 560 mL / OUT: 0 mL / NET: 560 mL        PHYSICAL EXAM:    General:  in no acute distress  HEENT: MMM, conjunctiva and sclera clear  Lungs: clear  Heart: regular  Gastrointestinal: Soft non-tender non-distended; good bowel sounds; ostomy good  Skin: Warm and dry. No obvious rash    LABS:      CBC Full  -  ( 14 Aug 2022 07:35 )  WBC Count : 8.17 K/uL  RBC Count : 4.03 M/uL  Hemoglobin : 12.3 g/dL  Hematocrit : 37.1 %  Platelet Count - Automated : 320 K/uL  Mean Cell Volume : 92.1 fl  Mean Cell Hemoglobin : 30.5 pg  Mean Cell Hemoglobin Concentration : 33.2 gm/dL  Auto Neutrophil # : x  Auto Lymphocyte # : x  Auto Monocyte # : x  Auto Eosinophil # : x  Auto Basophil # : x  Auto Neutrophil % : x  Auto Lymphocyte % : x  Auto Monocyte % : x  Auto Eosinophil % : x  Auto Basophil % : x    08-14    137  |  101  |  5<L>  ----------------------------<  105<H>  4.1   |  26  |  0.93    Ca    9.0      14 Aug 2022 07:35  Phos  4.3     08-14  Mg     1.9     08-14                        RADIOLOGY & ADDITIONAL STUDIES (The following images were personally reviewed):

## 2022-08-14 NOTE — PROGRESS NOTE ADULT - ASSESSMENT
diet per surgical team  continue antibiotics per ID  at some point biopsy of splenic lesion should be done

## 2022-08-14 NOTE — PROGRESS NOTE ADULT - ASSESSMENT
Perforated diverticulitis  S/P Sabas's procedure  Ileus  S/P PICC for TPN      RECOMMEND  Continue Pip-Tazo next 24-48 hours         [phone]

## 2022-08-14 NOTE — PROGRESS NOTE ADULT - SUBJECTIVE AND OBJECTIVE BOX
INTERVAL HPI/OVERNIGHT EVENTS:    STATUS POST:      POST OPERATIVE DAY #:     SUBJECTIVE:      heparin   Injectable 5000 Unit(s) SubCutaneous every 8 hours  piperacillin/tazobactam IVPB.. 3.375 Gram(s) IV Intermittent every 6 hours      Vital Signs Last 24 Hrs  T(C): 36.9 (14 Aug 2022 05:44), Max: 36.9 (14 Aug 2022 05:44)  T(F): 98.5 (14 Aug 2022 05:44), Max: 98.5 (14 Aug 2022 05:44)  HR: 88 (14 Aug 2022 05:44) (60 - 92)  BP: 114/69 (14 Aug 2022 05:44) (105/74 - 117/73)  BP(mean): --  RR: 18 (14 Aug 2022 05:44) (17 - 18)  SpO2: 97% (14 Aug 2022 05:44) (97% - 99%)    Parameters below as of 14 Aug 2022 05:44  Patient On (Oxygen Delivery Method): room air      I&O's Detail    12 Aug 2022 07:01  -  13 Aug 2022 07:00  --------------------------------------------------------  IN:    Lactated Ringers: 1980 mL    Oral Fluid: 360 mL  Total IN: 2340 mL    OUT:    Colostomy (mL): 425 mL    Voided (mL): 1700 mL  Total OUT: 2125 mL    Total NET: 215 mL      13 Aug 2022 07:01  -  14 Aug 2022 06:37  --------------------------------------------------------  IN:    IV PiggyBack: 200 mL    Lactated Ringers: 1620 mL  Total IN: 1820 mL    OUT:    Colostomy (mL): 0 mL    Voided (mL): 600 mL  Total OUT: 600 mL    Total NET: 1220 mL          General: NAD, resting comfortably in bed  C/V: NSR  Pulm: Nonlabored breathing, no respiratory distress  Abd: soft, NT/ND.  Extrem: WWP, no edema, SCDs in place  Drains:  Dorothy:      LABS:                        11.9   5.10  )-----------( 317      ( 13 Aug 2022 05:30 )             35.5     08-13    137  |  100  |  6<L>  ----------------------------<  93  4.3   |  26  |  0.69    Ca    9.1      13 Aug 2022 05:30  Phos  3.6     08-13  Mg     2.0     08-13            RADIOLOGY & ADDITIONAL STUDIES:   INTERVAL HPI/OVERNIGHT EVENTS: HLIV Did not advance b/c no ostomy output during the day. Amelia Full Liquids, -n/-v, liquid ostomy output.    STATUS POST: Sabas's procedure    POST OPERATIVE DAY #: 10    SUBJECTIVE: Patient seen and examined at bedside with chief. He has no complaints at this time, describes feeling some urgency to defecate. Otherwise denies n/v, abdominal pain, sob. cp. Ambulating comfortably and tolerating diet.      heparin   Injectable 5000 Unit(s) SubCutaneous every 8 hours  piperacillin/tazobactam IVPB.. 3.375 Gram(s) IV Intermittent every 6 hours      Vital Signs Last 24 Hrs  T(C): 36.9 (14 Aug 2022 05:44), Max: 36.9 (14 Aug 2022 05:44)  T(F): 98.5 (14 Aug 2022 05:44), Max: 98.5 (14 Aug 2022 05:44)  HR: 88 (14 Aug 2022 05:44) (60 - 92)  BP: 114/69 (14 Aug 2022 05:44) (105/74 - 117/73)  BP(mean): --  RR: 18 (14 Aug 2022 05:44) (17 - 18)  SpO2: 97% (14 Aug 2022 05:44) (97% - 99%)    Parameters below as of 14 Aug 2022 05:44  Patient On (Oxygen Delivery Method): room air      I&O's Detail    12 Aug 2022 07:01  -  13 Aug 2022 07:00  --------------------------------------------------------  IN:    Lactated Ringers: 1980 mL    Oral Fluid: 360 mL  Total IN: 2340 mL    OUT:    Colostomy (mL): 425 mL    Voided (mL): 1700 mL  Total OUT: 2125 mL    Total NET: 215 mL      13 Aug 2022 07:01  -  14 Aug 2022 06:37  --------------------------------------------------------  IN:    IV PiggyBack: 200 mL    Lactated Ringers: 1620 mL  Total IN: 1820 mL    OUT:    Colostomy (mL): 0 mL    Voided (mL): 600 mL  Total OUT: 600 mL    Total NET: 1220 mL          General: NAD, resting comfortably in bed  C/V: NSR  Pulm: Nonlabored breathing, no respiratory distress  Abd: soft, NT/ND. Ostomy ppp with some formed output.  Extrem: WWP, no edema, SCDs in place        LABS:                        11.9   5.10  )-----------( 317      ( 13 Aug 2022 05:30 )             35.5     08-13    137  |  100  |  6<L>  ----------------------------<  93  4.3   |  26  |  0.69    Ca    9.1      13 Aug 2022 05:30  Phos  3.6     08-13  Mg     2.0     08-13            RADIOLOGY & ADDITIONAL STUDIES:

## 2022-08-15 VITALS
OXYGEN SATURATION: 95 % | DIASTOLIC BLOOD PRESSURE: 72 MMHG | SYSTOLIC BLOOD PRESSURE: 116 MMHG | HEART RATE: 82 BPM | RESPIRATION RATE: 17 BRPM | TEMPERATURE: 98 F

## 2022-08-15 LAB
ANION GAP SERPL CALC-SCNC: 8 MMOL/L — SIGNIFICANT CHANGE UP (ref 5–17)
BUN SERPL-MCNC: 6 MG/DL — LOW (ref 7–23)
CALCIUM SERPL-MCNC: 8.8 MG/DL — SIGNIFICANT CHANGE UP (ref 8.4–10.5)
CHLORIDE SERPL-SCNC: 102 MMOL/L — SIGNIFICANT CHANGE UP (ref 96–108)
CO2 SERPL-SCNC: 26 MMOL/L — SIGNIFICANT CHANGE UP (ref 22–31)
CREAT SERPL-MCNC: 0.85 MG/DL — SIGNIFICANT CHANGE UP (ref 0.5–1.3)
EGFR: 111 ML/MIN/1.73M2 — SIGNIFICANT CHANGE UP
GLUCOSE SERPL-MCNC: 127 MG/DL — HIGH (ref 70–99)
HCT VFR BLD CALC: 35.6 % — LOW (ref 39–50)
HGB BLD-MCNC: 12.1 G/DL — LOW (ref 13–17)
MAGNESIUM SERPL-MCNC: 2.1 MG/DL — SIGNIFICANT CHANGE UP (ref 1.6–2.6)
MCHC RBC-ENTMCNC: 30.9 PG — SIGNIFICANT CHANGE UP (ref 27–34)
MCHC RBC-ENTMCNC: 34 GM/DL — SIGNIFICANT CHANGE UP (ref 32–36)
MCV RBC AUTO: 90.8 FL — SIGNIFICANT CHANGE UP (ref 80–100)
NRBC # BLD: 0 /100 WBCS — SIGNIFICANT CHANGE UP (ref 0–0)
PHOSPHATE SERPL-MCNC: 3.6 MG/DL — SIGNIFICANT CHANGE UP (ref 2.5–4.5)
PLATELET # BLD AUTO: 316 K/UL — SIGNIFICANT CHANGE UP (ref 150–400)
POTASSIUM SERPL-MCNC: 3.6 MMOL/L — SIGNIFICANT CHANGE UP (ref 3.5–5.3)
POTASSIUM SERPL-SCNC: 3.6 MMOL/L — SIGNIFICANT CHANGE UP (ref 3.5–5.3)
RBC # BLD: 3.92 M/UL — LOW (ref 4.2–5.8)
RBC # FLD: 12.1 % — SIGNIFICANT CHANGE UP (ref 10.3–14.5)
SODIUM SERPL-SCNC: 136 MMOL/L — SIGNIFICANT CHANGE UP (ref 135–145)
WBC # BLD: 10.33 K/UL — SIGNIFICANT CHANGE UP (ref 3.8–10.5)
WBC # FLD AUTO: 10.33 K/UL — SIGNIFICANT CHANGE UP (ref 3.8–10.5)

## 2022-08-15 PROCEDURE — U0005: CPT

## 2022-08-15 PROCEDURE — 83735 ASSAY OF MAGNESIUM: CPT

## 2022-08-15 PROCEDURE — 97116 GAIT TRAINING THERAPY: CPT

## 2022-08-15 PROCEDURE — U0003: CPT

## 2022-08-15 PROCEDURE — 74018 RADEX ABDOMEN 1 VIEW: CPT

## 2022-08-15 PROCEDURE — 74177 CT ABD & PELVIS W/CONTRAST: CPT | Mod: MA

## 2022-08-15 PROCEDURE — C1889: CPT

## 2022-08-15 PROCEDURE — 71045 X-RAY EXAM CHEST 1 VIEW: CPT

## 2022-08-15 PROCEDURE — 81003 URINALYSIS AUTO W/O SCOPE: CPT

## 2022-08-15 PROCEDURE — 86850 RBC ANTIBODY SCREEN: CPT

## 2022-08-15 PROCEDURE — 84100 ASSAY OF PHOSPHORUS: CPT

## 2022-08-15 PROCEDURE — 87040 BLOOD CULTURE FOR BACTERIA: CPT

## 2022-08-15 PROCEDURE — 99285 EMERGENCY DEPT VISIT HI MDM: CPT

## 2022-08-15 PROCEDURE — 86901 BLOOD TYPING SEROLOGIC RH(D): CPT

## 2022-08-15 PROCEDURE — 85610 PROTHROMBIN TIME: CPT

## 2022-08-15 PROCEDURE — 36573 INSJ PICC RS&I 5 YR+: CPT

## 2022-08-15 PROCEDURE — 87635 SARS-COV-2 COVID-19 AMP PRB: CPT

## 2022-08-15 PROCEDURE — 85730 THROMBOPLASTIN TIME PARTIAL: CPT

## 2022-08-15 PROCEDURE — 87186 SC STD MICRODIL/AGAR DIL: CPT

## 2022-08-15 PROCEDURE — 85025 COMPLETE CBC W/AUTO DIFF WBC: CPT

## 2022-08-15 PROCEDURE — C1751: CPT

## 2022-08-15 PROCEDURE — 97161 PT EVAL LOW COMPLEX 20 MIN: CPT

## 2022-08-15 PROCEDURE — 96361 HYDRATE IV INFUSION ADD-ON: CPT

## 2022-08-15 PROCEDURE — 86900 BLOOD TYPING SEROLOGIC ABO: CPT

## 2022-08-15 PROCEDURE — 80048 BASIC METABOLIC PNL TOTAL CA: CPT

## 2022-08-15 PROCEDURE — 85027 COMPLETE CBC AUTOMATED: CPT

## 2022-08-15 PROCEDURE — 36415 COLL VENOUS BLD VENIPUNCTURE: CPT

## 2022-08-15 PROCEDURE — 87075 CULTR BACTERIA EXCEPT BLOOD: CPT

## 2022-08-15 PROCEDURE — 87070 CULTURE OTHR SPECIMN AEROBIC: CPT

## 2022-08-15 PROCEDURE — 88307 TISSUE EXAM BY PATHOLOGIST: CPT

## 2022-08-15 PROCEDURE — 97110 THERAPEUTIC EXERCISES: CPT

## 2022-08-15 PROCEDURE — 96374 THER/PROPH/DIAG INJ IV PUSH: CPT

## 2022-08-15 RX ORDER — DOCUSATE SODIUM 100 MG
1 CAPSULE ORAL
Qty: 15 | Refills: 0
Start: 2022-08-15

## 2022-08-15 RX ORDER — OXYCODONE HYDROCHLORIDE 5 MG/1
1 TABLET ORAL
Qty: 10 | Refills: 0
Start: 2022-08-15

## 2022-08-15 RX ORDER — POTASSIUM CHLORIDE 20 MEQ
40 PACKET (EA) ORAL ONCE
Refills: 0 | Status: COMPLETED | OUTPATIENT
Start: 2022-08-15 | End: 2022-08-15

## 2022-08-15 RX ADMIN — Medication 40 MILLIEQUIVALENT(S): at 07:08

## 2022-08-15 RX ADMIN — PIPERACILLIN AND TAZOBACTAM 200 GRAM(S): 4; .5 INJECTION, POWDER, LYOPHILIZED, FOR SOLUTION INTRAVENOUS at 06:25

## 2022-08-15 RX ADMIN — HEPARIN SODIUM 5000 UNIT(S): 5000 INJECTION INTRAVENOUS; SUBCUTANEOUS at 06:25

## 2022-08-15 NOTE — PROGRESS NOTE ADULT - PROVIDER SPECIALTY LIST ADULT
Gastroenterology
Infectious Disease
Internal Medicine
Surgery
Surgery
Infectious Disease
Infectious Disease
Internal Medicine
Internal Medicine
Surgery
Infectious Disease
Internal Medicine
Internal Medicine
Surgery
Surgery
Infectious Disease
Internal Medicine
Surgery

## 2022-08-15 NOTE — PROGRESS NOTE ADULT - ASSESSMENT
42 M PMH of uncomplicated diverticulitis x3 (last episode 6 months ago) presents with acute complicated diverticulitis with focal perforation, on IV antibiotics now s/p Hartmans procedure on 8/4. c/b post-operative ileus.     - Abx: zosyn (8/4 - )  - LRD  - Pain and Nausea control  - SCD/SQH  - ostomy consult  - IS/OOB  - f/u ID; Remove picc before d/c   - discontinue tpn   - outpt MRI for 5cm splenic mass   - Dispo: home today

## 2022-08-15 NOTE — PROGRESS NOTE ADULT - SUBJECTIVE AND OBJECTIVE BOX
Pt seen and examined   tolerating diet    REVIEW OF SYSTEMS:  Constitutional: No fever, weight loss or fatigue  Cardiovascular: No chest pain, palpitations, dizziness or leg swelling  Gastrointestinal: No abdominal or epigastric pain. No nausea, vomiting or hematemesis; No diarrhea or constipation. No melena or hematochezia.  Skin: No itching, burning, rashes or lesions       MEDICATIONS:  MEDICATIONS  (STANDING):  heparin   Injectable 5000 Unit(s) SubCutaneous every 8 hours  pantoprazole  Injectable 40 milliGRAM(s) IV Push daily  piperacillin/tazobactam IVPB.. 3.375 Gram(s) IV Intermittent every 6 hours    MEDICATIONS  (PRN):  acetaminophen     Tablet .. 650 milliGRAM(s) Oral every 6 hours PRN mild pain (1-3)  melatonin 5 milliGRAM(s) Oral at bedtime PRN Sleep  oxyCODONE    IR 5 milliGRAM(s) Oral every 6 hours PRN Severe Pain (7 - 10)      Allergies    No Known Allergies    Intolerances        Vital Signs Last 24 Hrs  T(C): 36.6 (15 Aug 2022 08:05), Max: 36.8 (15 Aug 2022 05:35)  T(F): 97.9 (15 Aug 2022 08:05), Max: 98.3 (15 Aug 2022 05:35)  HR: 82 (15 Aug 2022 08:05) (75 - 108)  BP: 116/72 (15 Aug 2022 08:05) (93/61 - 116/72)  BP(mean): --  RR: 17 (15 Aug 2022 08:05) (17 - 17)  SpO2: 95% (15 Aug 2022 08:05) (94% - 98%)    Parameters below as of 15 Aug 2022 08:05  Patient On (Oxygen Delivery Method): room air        08-14 @ 07:01  -  08-15 @ 07:00  --------------------------------------------------------  IN: 1850 mL / OUT: 1100 mL / NET: 750 mL    08-15 @ 07:01  -  08-15 @ 12:37  --------------------------------------------------------  IN: 260 mL / OUT: 300 mL / NET: -40 mL        PHYSICAL EXAM:    General: ; in no acute distress  HEENT: MMM, conjunctiva and sclera clear  Lungs: clear  Heart: regular  Gastrointestinal: Soft non-tender non-distended; Normal bowel sounds; colostomy okay  Skin: Warm and dry. No obvious rash    LABS:      CBC Full  -  ( 15 Aug 2022 06:18 )  WBC Count : 10.33 K/uL  RBC Count : 3.92 M/uL  Hemoglobin : 12.1 g/dL  Hematocrit : 35.6 %  Platelet Count - Automated : 316 K/uL  Mean Cell Volume : 90.8 fl  Mean Cell Hemoglobin : 30.9 pg  Mean Cell Hemoglobin Concentration : 34.0 gm/dL  Auto Neutrophil # : x  Auto Lymphocyte # : x  Auto Monocyte # : x  Auto Eosinophil # : x  Auto Basophil # : x  Auto Neutrophil % : x  Auto Lymphocyte % : x  Auto Monocyte % : x  Auto Eosinophil % : x  Auto Basophil % : x    08-15    136  |  102  |  6<L>  ----------------------------<  127<H>  3.6   |  26  |  0.85    Ca    8.8      15 Aug 2022 06:18  Phos  3.6     08-15  Mg     2.1     08-15                        RADIOLOGY & ADDITIONAL STUDIES (The following images were personally reviewed):

## 2022-08-15 NOTE — PROGRESS NOTE ADULT - SUBJECTIVE AND OBJECTIVE BOX
STATUS POST:  8/4: Hartmans procedure     POST OPERATIVE DAY #: 11    SUBJECTIVE: Pt seen and examined at bedside this am by surgery team. This morning, he feels well; his pain is well-controlled. Tolerating low fiber diet, had mashed potatoes and meat yesterday. No nausea or vomiting. No acute complaints. Denies dizziness. Denies f/n/v/cp/sob.    Vital Signs Last 24 Hrs  T(C): 36.8 (15 Aug 2022 05:35), Max: 36.9 (14 Aug 2022 08:05)  T(F): 98.3 (15 Aug 2022 05:35), Max: 98.5 (14 Aug 2022 08:05)  HR: 75 (15 Aug 2022 05:35) (75 - 108)  BP: 114/69 (15 Aug 2022 05:35) (93/61 - 134/83)  BP(mean): --  RR: 17 (15 Aug 2022 05:35) (17 - 18)  SpO2: 94% (15 Aug 2022 05:35) (94% - 98%)    Parameters below as of 15 Aug 2022 05:35  Patient On (Oxygen Delivery Method): room air        PHYSICAL EXAM:  Constitutional: Awake, alert, NAD, resting comfortably in bed   Respiratory: non labored breathing, no respiratory distress  Cardiovascular: RRR  Gastrointestinal: soft, appropriately tender, non distended, no rebound, no guarding, midline incision healing well and dressing changed, ostomy functioning well with stool   Extremities: (-) edema, wwp               I&O's Detail    14 Aug 2022 07:01  -  15 Aug 2022 07:00  --------------------------------------------------------  IN:    IV PiggyBack: 100 mL    IV PiggyBack: 100 mL    IV PiggyBack: 200 mL    Oral Fluid: 800 mL    TPN (Total Parenteral Nutrition): 400 mL  Total IN: 1600 mL    OUT:    Colostomy (mL): 300 mL    Voided (mL): 800 mL  Total OUT: 1100 mL    Total NET: 500 mL          LABS:                        12.1   10.33 )-----------( 316      ( 15 Aug 2022 06:18 )             35.6     08-15    136  |  102  |  6<L>  ----------------------------<  127<H>  3.6   |  26  |  0.85    Ca    8.8      15 Aug 2022 06:18  Phos  3.6     08-15  Mg     2.1     08-15            RADIOLOGY & ADDITIONAL STUDIES:

## 2022-08-16 LAB — SURGICAL PATHOLOGY STUDY: SIGNIFICANT CHANGE UP

## 2022-08-19 DIAGNOSIS — Z53.29 PROCEDURE AND TREATMENT NOT CARRIED OUT BECAUSE OF PATIENT'S DECISION FOR OTHER REASONS: ICD-10-CM

## 2022-08-19 DIAGNOSIS — R16.1 SPLENOMEGALY, NOT ELSEWHERE CLASSIFIED: ICD-10-CM

## 2022-08-19 DIAGNOSIS — K65.9 PERITONITIS, UNSPECIFIED: ICD-10-CM

## 2022-08-19 DIAGNOSIS — K91.89 OTHER POSTPROCEDURAL COMPLICATIONS AND DISORDERS OF DIGESTIVE SYSTEM: ICD-10-CM

## 2022-08-19 DIAGNOSIS — K57.20 DIVERTICULITIS OF LARGE INTESTINE WITH PERFORATION AND ABSCESS WITHOUT BLEEDING: ICD-10-CM

## 2022-08-19 DIAGNOSIS — A41.9 SEPSIS, UNSPECIFIED ORGANISM: ICD-10-CM

## 2022-08-19 DIAGNOSIS — K56.7 ILEUS, UNSPECIFIED: ICD-10-CM

## 2022-11-04 PROBLEM — K57.92 DIVERTICULITIS OF INTESTINE, PART UNSPECIFIED, WITHOUT PERFORATION OR ABSCESS WITHOUT BLEEDING: Chronic | Status: ACTIVE | Noted: 2022-08-04

## 2022-11-09 VITALS
WEIGHT: 181.66 LBS | HEIGHT: 72 IN | RESPIRATION RATE: 18 BRPM | SYSTOLIC BLOOD PRESSURE: 119 MMHG | DIASTOLIC BLOOD PRESSURE: 79 MMHG | HEART RATE: 87 BPM

## 2022-11-09 NOTE — PATIENT PROFILE ADULT - FALL HARM RISK - HARM RISK INTERVENTIONS

## 2022-12-01 ENCOUNTER — INPATIENT (INPATIENT)
Facility: HOSPITAL | Age: 42
LOS: 3 days | Discharge: HOME | DRG: 331 | End: 2022-12-05
Attending: SURGERY | Admitting: SURGERY
Payer: MEDICAID

## 2022-12-01 DIAGNOSIS — Z90.49 ACQUIRED ABSENCE OF OTHER SPECIFIED PARTS OF DIGESTIVE TRACT: Chronic | ICD-10-CM

## 2022-12-01 DIAGNOSIS — Z98.890 OTHER SPECIFIED POSTPROCEDURAL STATES: Chronic | ICD-10-CM

## 2022-12-01 LAB
BLD GP AB SCN SERPL QL: NEGATIVE — SIGNIFICANT CHANGE UP
GLUCOSE BLDC GLUCOMTR-MCNC: 93 MG/DL — SIGNIFICANT CHANGE UP (ref 70–99)
RH IG SCN BLD-IMP: POSITIVE — SIGNIFICANT CHANGE UP

## 2022-12-01 PROCEDURE — 88307 TISSUE EXAM BY PATHOLOGIST: CPT | Mod: 26

## 2022-12-01 PROCEDURE — 88304 TISSUE EXAM BY PATHOLOGIST: CPT | Mod: 26

## 2022-12-01 DEVICE — STAPLER COVIDIEN PURSTRING 65MM: Type: IMPLANTABLE DEVICE | Status: FUNCTIONAL

## 2022-12-01 DEVICE — STAPLER COVIDIEN TRI-STAPLE 60MM PURPLE RELOAD: Type: IMPLANTABLE DEVICE | Status: FUNCTIONAL

## 2022-12-01 DEVICE — STAPLER COVIDIEN EEA CIRCULAR DST 28MM 4.5MM BLUE: Type: IMPLANTABLE DEVICE | Status: FUNCTIONAL

## 2022-12-01 RX ORDER — HYDROMORPHONE HYDROCHLORIDE 2 MG/ML
1 INJECTION INTRAMUSCULAR; INTRAVENOUS; SUBCUTANEOUS EVERY 4 HOURS
Refills: 0 | Status: DISCONTINUED | OUTPATIENT
Start: 2022-12-01 | End: 2022-12-01

## 2022-12-01 RX ORDER — HEPARIN SODIUM 5000 [USP'U]/ML
5000 INJECTION INTRAVENOUS; SUBCUTANEOUS ONCE
Refills: 0 | Status: COMPLETED | OUTPATIENT
Start: 2022-12-01 | End: 2022-12-01

## 2022-12-01 RX ORDER — ACETAMINOPHEN 500 MG
1000 TABLET ORAL ONCE
Refills: 0 | Status: COMPLETED | OUTPATIENT
Start: 2022-12-01 | End: 2022-12-01

## 2022-12-01 RX ORDER — HYDROMORPHONE HYDROCHLORIDE 2 MG/ML
0.25 INJECTION INTRAMUSCULAR; INTRAVENOUS; SUBCUTANEOUS
Refills: 0 | Status: DISCONTINUED | OUTPATIENT
Start: 2022-12-01 | End: 2022-12-02

## 2022-12-01 RX ORDER — FINASTERIDE 5 MG/1
1 TABLET, FILM COATED ORAL
Qty: 0 | Refills: 0 | DISCHARGE

## 2022-12-01 RX ORDER — OXYCODONE HYDROCHLORIDE 5 MG/1
2.5 TABLET ORAL EVERY 6 HOURS
Refills: 0 | Status: DISCONTINUED | OUTPATIENT
Start: 2022-12-01 | End: 2022-12-01

## 2022-12-01 RX ORDER — SODIUM CHLORIDE 9 MG/ML
1000 INJECTION, SOLUTION INTRAVENOUS
Refills: 0 | Status: DISCONTINUED | OUTPATIENT
Start: 2022-12-01 | End: 2022-12-02

## 2022-12-01 RX ORDER — HYDROMORPHONE HYDROCHLORIDE 2 MG/ML
0.5 INJECTION INTRAMUSCULAR; INTRAVENOUS; SUBCUTANEOUS
Refills: 0 | Status: DISCONTINUED | OUTPATIENT
Start: 2022-12-01 | End: 2022-12-01

## 2022-12-01 RX ORDER — KETOROLAC TROMETHAMINE 30 MG/ML
15 SYRINGE (ML) INJECTION EVERY 6 HOURS
Refills: 0 | Status: DISCONTINUED | OUTPATIENT
Start: 2022-12-01 | End: 2022-12-01

## 2022-12-01 RX ORDER — HEPARIN SODIUM 5000 [USP'U]/ML
5000 INJECTION INTRAVENOUS; SUBCUTANEOUS EVERY 8 HOURS
Refills: 0 | Status: DISCONTINUED | OUTPATIENT
Start: 2022-12-02 | End: 2022-12-02

## 2022-12-01 RX ORDER — KETOROLAC TROMETHAMINE 30 MG/ML
15 SYRINGE (ML) INJECTION EVERY 6 HOURS
Refills: 0 | Status: DISCONTINUED | OUTPATIENT
Start: 2022-12-02 | End: 2022-12-02

## 2022-12-01 RX ADMIN — Medication 400 MILLIGRAM(S): at 19:39

## 2022-12-01 RX ADMIN — HYDROMORPHONE HYDROCHLORIDE 1 MILLIGRAM(S): 2 INJECTION INTRAMUSCULAR; INTRAVENOUS; SUBCUTANEOUS at 19:38

## 2022-12-01 RX ADMIN — HEPARIN SODIUM 5000 UNIT(S): 5000 INJECTION INTRAVENOUS; SUBCUTANEOUS at 09:49

## 2022-12-01 RX ADMIN — Medication 1000 MILLIGRAM(S): at 09:49

## 2022-12-01 NOTE — H&P ADULT - ASSESSMENT
42M PMHx recurrent diverticulitis, recently admitted for perforated diverticulitis 8/4-15 s/p Sabas's procedure (8/4) c/b prolonged postoperative ileus, who now presents for elective Sabas's reversal. Pt has no acute complaints and is ready to proceed to OR.     - OR  - ERAS colon pathway

## 2022-12-01 NOTE — BRIEF OPERATIVE NOTE - NSICDXBRIEFPROCEDURE_GEN_ALL_CORE_FT
PROCEDURES:  Reversal, Sabas procedure, laparoscopic 01-Dec-2022 18:32:52  Gomez Truong  Flexible sigmoidoscopy 01-Dec-2022 18:33:04  Gomez Truong

## 2022-12-01 NOTE — H&P ADULT - NSHPPHYSICALEXAM_GEN_ALL_CORE
T(C): --  HR: 87 (12-01-22 @ 13:09) (87 - 87)  BP: 119/79 (12-01-22 @ 13:09) (119/79 - 119/79)  RR: 18 (12-01-22 @ 13:09) (18 - 18)  SpO2: --    GENERAL: NAD, Resting comfortably in bed, awake, opens eyes spontaneously  HEENT: NCAT, MMM, Normal conjunctiva, PERRL  RESP: Nonlabored breathing, No respiratory distress  CARD: Normal rate, Normal peripheral perfusion  GI: Soft, ND, NT, No guarding, No rebound tenderness  EXTREM: WWP, No edema, No gross deformity of extremities  SKIN: No rashes, no lesions  NEURO: AAOx3, No focal motor or sensory deficits  PSYCH: Affect and characteristics of appearance, verbalizations, and behaviors are appropriate

## 2022-12-01 NOTE — PROGRESS NOTE ADULT - SUBJECTIVE AND OBJECTIVE BOX
STATUS POST:  Asbas's Reversal    SUBJECTIVE: Pt seen on regional floors. Notes abdominal discomfort but controlled with pain medications. No N/V/F/C, chest pain, SOB, or leg pain. No return of bowel function at this time.    Vital Signs Last 24 Hrs  T(C): 37.2 (01 Dec 2022 18:38), Max: 37.2 (01 Dec 2022 18:38)  T(F): 99 (01 Dec 2022 18:38), Max: 99 (01 Dec 2022 18:38)  HR: 112 (01 Dec 2022 20:54) (74 - 114)  BP: 118/76 (01 Dec 2022 20:54) (118/76 - 148/81)  BP(mean): 92 (01 Dec 2022 20:54) (92 - 111)  RR: 25 (01 Dec 2022 20:54) (12 - 25)  SpO2: 100% (01 Dec 2022 20:54) (98% - 100%)    Parameters below as of 01 Dec 2022 19:53  Patient On (Oxygen Delivery Method): mask, nonrebreather  O2 Flow (L/min): 12    I&O's Summary    01 Dec 2022 07:01  -  01 Dec 2022 22:14  --------------------------------------------------------  IN: 40 mL / OUT: 90 mL / NET: -50 mL      I&O's Detail    01 Dec 2022 07:01  -  01 Dec 2022 22:14  --------------------------------------------------------  IN:    Lactated Ringers: 40 mL  Total IN: 40 mL    OUT:    Indwelling Catheter - Urethral (mL): 90 mL  Total OUT: 90 mL    Total NET: -50 mL            LABS:                Physical exam :  Neuro: Alert and Oriented X 4. No apparent focal neural deficits  HEENT: NCAT. Mucous membranes moist.   Respiratory:  No respiratory distress  Cardiovascular: Non tachy/susie  Gastrointestinal: soft, mild-moderate tenderness at incision sites/ND. No rebound/guarding                 Incision: covered with gauze and tegaderm, L sided gauze mildly saturated with serosanguinous fluid, otherwise c/d/i  Extremities: wwp, SCD in place     A/P: 42y Male   - Diet:   - Activity:  - Labs:   - Pain medication  - DVT ppx  - Antibiotic

## 2022-12-01 NOTE — BRIEF OPERATIVE NOTE - OPERATION/FINDINGS
Colostomy taken down and stoma site used as 12mm camera port. Very little adhesions. Decision made to proceed with Laparoscopic approach.  Splenic flexure taken down . Rectosigmoid divided . Upper rectum disected, Hemostasis ensursed.. End to End anastomosis with CEA 28. Leak test negative. Flexi performed. Anastomsis intact. no bleeding. Colostomy taken down and stoma site used as 12mm camera port. Very little adhesions. Decision made to proceed with Laparoscopic approach. under visualisation, 5mm ports x 2. 12mm port x1 inserted. Splenic flexure taken down . Rectosigmoid divided . Upper rectum dissected,   Specimen retrieved/ Bowel exteriorised via stoma site. Purestring device used. Anvil inserted and secured x2. Hemostasis ensursed. End to End anastomosis with CEA 28. Leak test negative. Leak test negative. Anastomsis intact at 15cm.No bleeding. Closure of stoma site fascia. 12mm port site closed. Exparel infiltrated. Stoma skin closed in purestring fashion. Wick inserted.

## 2022-12-01 NOTE — H&P ADULT - HISTORY OF PRESENT ILLNESS
42M PMHx recurrent diverticulitis, recently admitted for perforated diverticulitis 8/4-15 s/p Sabas's procedure (8/4) c/b prolonged postoperative ileus, who now presents for elective Sabas's reversal. Pt has no acute complaints and is ready to proceed to OR.

## 2022-12-01 NOTE — H&P ADULT - NSICDXPASTSURGICALHX_GEN_ALL_CORE_FT
PAST SURGICAL HISTORY:  H/O knee surgery right knee meniscus repair    S/P colon resection aug 2022

## 2022-12-02 LAB
ANION GAP SERPL CALC-SCNC: 10 MMOL/L — SIGNIFICANT CHANGE UP (ref 5–17)
BASOPHILS # BLD AUTO: 0.01 K/UL — SIGNIFICANT CHANGE UP (ref 0–0.2)
BASOPHILS NFR BLD AUTO: 0.1 % — SIGNIFICANT CHANGE UP (ref 0–2)
BLD GP AB SCN SERPL QL: NEGATIVE — SIGNIFICANT CHANGE UP
BUN SERPL-MCNC: 10 MG/DL — SIGNIFICANT CHANGE UP (ref 7–23)
CALCIUM SERPL-MCNC: 8.6 MG/DL — SIGNIFICANT CHANGE UP (ref 8.4–10.5)
CHLORIDE SERPL-SCNC: 100 MMOL/L — SIGNIFICANT CHANGE UP (ref 96–108)
CO2 SERPL-SCNC: 26 MMOL/L — SIGNIFICANT CHANGE UP (ref 22–31)
CREAT SERPL-MCNC: 0.74 MG/DL — SIGNIFICANT CHANGE UP (ref 0.5–1.3)
EGFR: 116 ML/MIN/1.73M2 — SIGNIFICANT CHANGE UP
EOSINOPHIL # BLD AUTO: 0 K/UL — SIGNIFICANT CHANGE UP (ref 0–0.5)
EOSINOPHIL NFR BLD AUTO: 0 % — SIGNIFICANT CHANGE UP (ref 0–6)
GLUCOSE SERPL-MCNC: 119 MG/DL — HIGH (ref 70–99)
HCT VFR BLD CALC: 29.8 % — LOW (ref 39–50)
HCT VFR BLD CALC: 29.9 % — LOW (ref 39–50)
HCT VFR BLD CALC: 34 % — LOW (ref 39–50)
HCT VFR BLD CALC: 34.5 % — LOW (ref 39–50)
HGB BLD-MCNC: 10 G/DL — LOW (ref 13–17)
HGB BLD-MCNC: 10.2 G/DL — LOW (ref 13–17)
HGB BLD-MCNC: 11.5 G/DL — LOW (ref 13–17)
HGB BLD-MCNC: 11.8 G/DL — LOW (ref 13–17)
IMM GRANULOCYTES NFR BLD AUTO: 0.5 % — SIGNIFICANT CHANGE UP (ref 0–0.9)
LYMPHOCYTES # BLD AUTO: 0.75 K/UL — LOW (ref 1–3.3)
LYMPHOCYTES # BLD AUTO: 9.3 % — LOW (ref 13–44)
MCHC RBC-ENTMCNC: 30.1 PG — SIGNIFICANT CHANGE UP (ref 27–34)
MCHC RBC-ENTMCNC: 30.3 PG — SIGNIFICANT CHANGE UP (ref 27–34)
MCHC RBC-ENTMCNC: 30.5 PG — SIGNIFICANT CHANGE UP (ref 27–34)
MCHC RBC-ENTMCNC: 30.7 PG — SIGNIFICANT CHANGE UP (ref 27–34)
MCHC RBC-ENTMCNC: 33.6 GM/DL — SIGNIFICANT CHANGE UP (ref 32–36)
MCHC RBC-ENTMCNC: 33.8 GM/DL — SIGNIFICANT CHANGE UP (ref 32–36)
MCHC RBC-ENTMCNC: 34.1 GM/DL — SIGNIFICANT CHANGE UP (ref 32–36)
MCHC RBC-ENTMCNC: 34.2 GM/DL — SIGNIFICANT CHANGE UP (ref 32–36)
MCV RBC AUTO: 88.7 FL — SIGNIFICANT CHANGE UP (ref 80–100)
MCV RBC AUTO: 89.8 FL — SIGNIFICANT CHANGE UP (ref 80–100)
MCV RBC AUTO: 90.1 FL — SIGNIFICANT CHANGE UP (ref 80–100)
MCV RBC AUTO: 90.2 FL — SIGNIFICANT CHANGE UP (ref 80–100)
MONOCYTES # BLD AUTO: 0.83 K/UL — SIGNIFICANT CHANGE UP (ref 0–0.9)
MONOCYTES NFR BLD AUTO: 10.3 % — SIGNIFICANT CHANGE UP (ref 2–14)
NEUTROPHILS # BLD AUTO: 6.41 K/UL — SIGNIFICANT CHANGE UP (ref 1.8–7.4)
NEUTROPHILS NFR BLD AUTO: 79.8 % — HIGH (ref 43–77)
NRBC # BLD: 0 /100 WBCS — SIGNIFICANT CHANGE UP (ref 0–0)
PLATELET # BLD AUTO: 229 K/UL — SIGNIFICANT CHANGE UP (ref 150–400)
PLATELET # BLD AUTO: 232 K/UL — SIGNIFICANT CHANGE UP (ref 150–400)
PLATELET # BLD AUTO: 270 K/UL — SIGNIFICANT CHANGE UP (ref 150–400)
PLATELET # BLD AUTO: 274 K/UL — SIGNIFICANT CHANGE UP (ref 150–400)
POTASSIUM SERPL-MCNC: 4.4 MMOL/L — SIGNIFICANT CHANGE UP (ref 3.5–5.3)
POTASSIUM SERPL-SCNC: 4.4 MMOL/L — SIGNIFICANT CHANGE UP (ref 3.5–5.3)
RBC # BLD: 3.32 M/UL — LOW (ref 4.2–5.8)
RBC # BLD: 3.32 M/UL — LOW (ref 4.2–5.8)
RBC # BLD: 3.77 M/UL — LOW (ref 4.2–5.8)
RBC # BLD: 3.89 M/UL — LOW (ref 4.2–5.8)
RBC # FLD: 12.3 % — SIGNIFICANT CHANGE UP (ref 10.3–14.5)
RBC # FLD: 12.4 % — SIGNIFICANT CHANGE UP (ref 10.3–14.5)
RBC # FLD: 12.5 % — SIGNIFICANT CHANGE UP (ref 10.3–14.5)
RBC # FLD: 12.5 % — SIGNIFICANT CHANGE UP (ref 10.3–14.5)
RH IG SCN BLD-IMP: POSITIVE — SIGNIFICANT CHANGE UP
SODIUM SERPL-SCNC: 136 MMOL/L — SIGNIFICANT CHANGE UP (ref 135–145)
WBC # BLD: 6.29 K/UL — SIGNIFICANT CHANGE UP (ref 3.8–10.5)
WBC # BLD: 6.43 K/UL — SIGNIFICANT CHANGE UP (ref 3.8–10.5)
WBC # BLD: 8.04 K/UL — SIGNIFICANT CHANGE UP (ref 3.8–10.5)
WBC # BLD: 9 K/UL — SIGNIFICANT CHANGE UP (ref 3.8–10.5)
WBC # FLD AUTO: 6.29 K/UL — SIGNIFICANT CHANGE UP (ref 3.8–10.5)
WBC # FLD AUTO: 6.43 K/UL — SIGNIFICANT CHANGE UP (ref 3.8–10.5)
WBC # FLD AUTO: 8.04 K/UL — SIGNIFICANT CHANGE UP (ref 3.8–10.5)
WBC # FLD AUTO: 9 K/UL — SIGNIFICANT CHANGE UP (ref 3.8–10.5)

## 2022-12-02 RX ORDER — OXYCODONE HYDROCHLORIDE 5 MG/1
5 TABLET ORAL EVERY 6 HOURS
Refills: 0 | Status: DISCONTINUED | OUTPATIENT
Start: 2022-12-02 | End: 2022-12-05

## 2022-12-02 RX ORDER — ACETAMINOPHEN 500 MG
1000 TABLET ORAL EVERY 6 HOURS
Refills: 0 | Status: DISCONTINUED | OUTPATIENT
Start: 2022-12-02 | End: 2022-12-05

## 2022-12-02 RX ORDER — DEXTROSE MONOHYDRATE, SODIUM CHLORIDE, AND POTASSIUM CHLORIDE 50; .745; 4.5 G/1000ML; G/1000ML; G/1000ML
1000 INJECTION, SOLUTION INTRAVENOUS
Refills: 0 | Status: DISCONTINUED | OUTPATIENT
Start: 2022-12-02 | End: 2022-12-04

## 2022-12-02 RX ORDER — OXYCODONE HYDROCHLORIDE 5 MG/1
10 TABLET ORAL EVERY 6 HOURS
Refills: 0 | Status: DISCONTINUED | OUTPATIENT
Start: 2022-12-02 | End: 2022-12-05

## 2022-12-02 RX ADMIN — HEPARIN SODIUM 5000 UNIT(S): 5000 INJECTION INTRAVENOUS; SUBCUTANEOUS at 01:23

## 2022-12-02 RX ADMIN — DEXTROSE MONOHYDRATE, SODIUM CHLORIDE, AND POTASSIUM CHLORIDE 120 MILLILITER(S): 50; .745; 4.5 INJECTION, SOLUTION INTRAVENOUS at 23:01

## 2022-12-02 RX ADMIN — SODIUM CHLORIDE 40 MILLILITER(S): 9 INJECTION, SOLUTION INTRAVENOUS at 01:23

## 2022-12-02 RX ADMIN — DEXTROSE MONOHYDRATE, SODIUM CHLORIDE, AND POTASSIUM CHLORIDE 120 MILLILITER(S): 50; .745; 4.5 INJECTION, SOLUTION INTRAVENOUS at 17:02

## 2022-12-02 RX ADMIN — Medication 15 MILLIGRAM(S): at 02:23

## 2022-12-02 RX ADMIN — Medication 15 MILLIGRAM(S): at 01:23

## 2022-12-02 RX ADMIN — Medication 1000 MILLIGRAM(S): at 23:25

## 2022-12-02 RX ADMIN — OXYCODONE HYDROCHLORIDE 10 MILLIGRAM(S): 5 TABLET ORAL at 16:26

## 2022-12-02 RX ADMIN — HYDROMORPHONE HYDROCHLORIDE 0.25 MILLIGRAM(S): 2 INJECTION INTRAMUSCULAR; INTRAVENOUS; SUBCUTANEOUS at 06:47

## 2022-12-02 RX ADMIN — DEXTROSE MONOHYDRATE, SODIUM CHLORIDE, AND POTASSIUM CHLORIDE 40 MILLILITER(S): 50; .745; 4.5 INJECTION, SOLUTION INTRAVENOUS at 11:02

## 2022-12-02 RX ADMIN — OXYCODONE HYDROCHLORIDE 10 MILLIGRAM(S): 5 TABLET ORAL at 17:26

## 2022-12-02 RX ADMIN — Medication 1000 MILLIGRAM(S): at 17:59

## 2022-12-02 RX ADMIN — HYDROMORPHONE HYDROCHLORIDE 0.25 MILLIGRAM(S): 2 INJECTION INTRAMUSCULAR; INTRAVENOUS; SUBCUTANEOUS at 07:50

## 2022-12-02 NOTE — PROGRESS NOTE ADULT - SUBJECTIVE AND OBJECTIVE BOX
STATUS POST:  Reversal, Sabas procedure, laparoscopic    Flexible sigmoidoscopy        POST OPERATIVE DAY #: 1    SUBJECTIVE:   Patient seen and examined on am rounds. No new complaints. -f-bm. -n-v-cp-sob.    Vital Signs Last 24 Hrs  T(C): 36.8 (02 Dec 2022 05:18), Max: 37.2 (01 Dec 2022 18:38)  T(F): 98.2 (02 Dec 2022 05:18), Max: 99 (01 Dec 2022 18:38)  HR: 85 (02 Dec 2022 05:18) (74 - 114)  BP: 121/71 (02 Dec 2022 05:18) (100/61 - 148/81)  BP(mean): 88 (02 Dec 2022 05:18) (80 - 111)  RR: 20 (02 Dec 2022 05:18) (12 - 25)  SpO2: 96% (02 Dec 2022 05:18) (94% - 100%)    Parameters below as of 02 Dec 2022 05:18  Patient On (Oxygen Delivery Method): room air        I&O's Summary    01 Dec 2022 07:01  -  02 Dec 2022 07:00  --------------------------------------------------------  IN: 140 mL / OUT: 590 mL / NET: -450 mL        Physical Exam:  General Appearance: Appears well, NAD  Pulmonary: Nonlabored breathing, no respiratory distress  Cardiovascular: NSR  Abdomen: Soft, nondisteded, TTP LLQ. Dressings cdi.  Extremities: WWP, SCD's in place     LABS:                        11.5   8.04  )-----------( 270      ( 02 Dec 2022 07:51 )             34.0

## 2022-12-02 NOTE — PROGRESS NOTE ADULT - SUBJECTIVE AND OBJECTIVE BOX
42M PMHx recurrent diverticulitis, recently admitted for perforated diverticulitis 8/4-15 s/p Sabas's procedure (8/4) c/b prolonged postoperative ileus, who now presents for elective Sabas's reversal. Post op day one complaining of rectal bleed      REVIEW OF SYSTEMS:  Constitutional: No fever, weight loss or fatigue  ENMT:  No difficulty hearing, tinnitus, vertigo; No sinus or throat pain  Respiratory: No cough, wheezing, chills or hemoptysis  Cardiovascular: No chest pain, palpitations, dizziness or leg swelling  Gastrointestinal: No abdominal or epigastric pain. No nausea, vomiting or hematemesis;+ blood  Skin: No itching, burning, rashes or lesions   Musculoskeletal: No joint pain or swelling; No muscle, back or extremity pain    PAST MEDICAL & SURGICAL HISTORY:  Diverticulitis      H/O knee surgery  right knee meniscus repair      S/P colon resection  aug 2022          FAMILY HISTORY:  No pertinent family history in first degree relatives        SOCIAL HISTORY:  Smoking Status: [ ] Current, [ ] Former, [ ] Never  Pack Years:    MEDICATIONS:  MEDICATIONS  (STANDING):  dextrose 5% + sodium chloride 0.45% with potassium chloride 10 mEq/L 1000 milliLiter(s) (40 mL/Hr) IV Continuous <Continuous>  heparin   Injectable 5000 Unit(s) SubCutaneous every 8 hours    MEDICATIONS  (PRN):  HYDROmorphone  Injectable 0.25 milliGRAM(s) IV Push every 2 hours PRN Severe Pain (7 - 10)  ketorolac   Injectable 15 milliGRAM(s) IV Push every 6 hours PRN Moderate Pain (4 - 6)      Allergies    No Known Allergies    Intolerances        Vital Signs Last 24 Hrs  T(C): 36.7 (02 Dec 2022 09:54), Max: 37.2 (01 Dec 2022 18:38)  T(F): 98.1 (02 Dec 2022 09:54), Max: 99 (01 Dec 2022 18:38)  HR: 73 (02 Dec 2022 09:54) (72 - 114)  BP: 122/80 (02 Dec 2022 09:54) (100/61 - 148/81)  BP(mean): 88 (02 Dec 2022 05:18) (80 - 111)  RR: 17 (02 Dec 2022 09:54) (12 - 25)  SpO2: 98% (02 Dec 2022 09:54) (94% - 100%)    Parameters below as of 02 Dec 2022 09:54  Patient On (Oxygen Delivery Method): room air        12-01 @ 07:01  -  12-02 @ 07:00  --------------------------------------------------------  IN: 140 mL / OUT: 590 mL / NET: -450 mL          PHYSICAL EXAM:    General: thin; in no acute distress  HEENT: MMM, conjunctiva and sclera clear  Lungs: clear  Heart: regular  Gastrointestinal: Soft, non-tender non-distended; Normal bowel sounds; wounds dry  Extremities: Normal range of motion, No clubbing, cyanosis or edema  Neurological: Alert and oriented x3  Skin: Warm and dry. No obvious rash      LABS:                        11.5   8.04  )-----------( 270      ( 02 Dec 2022 07:51 )             34.0     12-02    136  |  100  |  10  ----------------------------<  119<H>  4.4   |  26  |  0.74    Ca    8.6      02 Dec 2022 07:51            RADIOLOGY & ADDITIONAL STUDIES:

## 2022-12-03 LAB
ANION GAP SERPL CALC-SCNC: 6 MMOL/L — SIGNIFICANT CHANGE UP (ref 5–17)
BUN SERPL-MCNC: 6 MG/DL — LOW (ref 7–23)
CALCIUM SERPL-MCNC: 8.4 MG/DL — SIGNIFICANT CHANGE UP (ref 8.4–10.5)
CHLORIDE SERPL-SCNC: 104 MMOL/L — SIGNIFICANT CHANGE UP (ref 96–108)
CO2 SERPL-SCNC: 30 MMOL/L — SIGNIFICANT CHANGE UP (ref 22–31)
CREAT SERPL-MCNC: 0.7 MG/DL — SIGNIFICANT CHANGE UP (ref 0.5–1.3)
EGFR: 118 ML/MIN/1.73M2 — SIGNIFICANT CHANGE UP
GLUCOSE SERPL-MCNC: 115 MG/DL — HIGH (ref 70–99)
HCT VFR BLD CALC: 29.6 % — LOW (ref 39–50)
HGB BLD-MCNC: 10 G/DL — LOW (ref 13–17)
MAGNESIUM SERPL-MCNC: 2.4 MG/DL — SIGNIFICANT CHANGE UP (ref 1.6–2.6)
MCHC RBC-ENTMCNC: 30.4 PG — SIGNIFICANT CHANGE UP (ref 27–34)
MCHC RBC-ENTMCNC: 33.8 GM/DL — SIGNIFICANT CHANGE UP (ref 32–36)
MCV RBC AUTO: 90 FL — SIGNIFICANT CHANGE UP (ref 80–100)
NRBC # BLD: 0 /100 WBCS — SIGNIFICANT CHANGE UP (ref 0–0)
PHOSPHATE SERPL-MCNC: 2.2 MG/DL — LOW (ref 2.5–4.5)
PLATELET # BLD AUTO: 212 K/UL — SIGNIFICANT CHANGE UP (ref 150–400)
POTASSIUM SERPL-MCNC: 4.1 MMOL/L — SIGNIFICANT CHANGE UP (ref 3.5–5.3)
POTASSIUM SERPL-SCNC: 4.1 MMOL/L — SIGNIFICANT CHANGE UP (ref 3.5–5.3)
RBC # BLD: 3.29 M/UL — LOW (ref 4.2–5.8)
RBC # FLD: 12.6 % — SIGNIFICANT CHANGE UP (ref 10.3–14.5)
SODIUM SERPL-SCNC: 140 MMOL/L — SIGNIFICANT CHANGE UP (ref 135–145)
WBC # BLD: 5.97 K/UL — SIGNIFICANT CHANGE UP (ref 3.8–10.5)
WBC # FLD AUTO: 5.97 K/UL — SIGNIFICANT CHANGE UP (ref 3.8–10.5)

## 2022-12-03 RX ORDER — HEPARIN SODIUM 5000 [USP'U]/ML
5000 INJECTION INTRAVENOUS; SUBCUTANEOUS EVERY 8 HOURS
Refills: 0 | Status: DISCONTINUED | OUTPATIENT
Start: 2022-12-03 | End: 2022-12-05

## 2022-12-03 RX ORDER — POTASSIUM PHOSPHATE, MONOBASIC POTASSIUM PHOSPHATE, DIBASIC 236; 224 MG/ML; MG/ML
15 INJECTION, SOLUTION INTRAVENOUS ONCE
Refills: 0 | Status: COMPLETED | OUTPATIENT
Start: 2022-12-03 | End: 2022-12-03

## 2022-12-03 RX ADMIN — Medication 1000 MILLIGRAM(S): at 05:19

## 2022-12-03 RX ADMIN — DEXTROSE MONOHYDRATE, SODIUM CHLORIDE, AND POTASSIUM CHLORIDE 120 MILLILITER(S): 50; .745; 4.5 INJECTION, SOLUTION INTRAVENOUS at 07:00

## 2022-12-03 RX ADMIN — POTASSIUM PHOSPHATE, MONOBASIC POTASSIUM PHOSPHATE, DIBASIC 62.5 MILLIMOLE(S): 236; 224 INJECTION, SOLUTION INTRAVENOUS at 11:55

## 2022-12-03 RX ADMIN — Medication 1000 MILLIGRAM(S): at 19:09

## 2022-12-03 RX ADMIN — HEPARIN SODIUM 5000 UNIT(S): 5000 INJECTION INTRAVENOUS; SUBCUTANEOUS at 21:53

## 2022-12-03 RX ADMIN — DEXTROSE MONOHYDRATE, SODIUM CHLORIDE, AND POTASSIUM CHLORIDE 40 MILLILITER(S): 50; .745; 4.5 INJECTION, SOLUTION INTRAVENOUS at 21:53

## 2022-12-03 NOTE — PROGRESS NOTE ADULT - SUBJECTIVE AND OBJECTIVE BOX
INTERVAL HPI/OVERNIGHT EVENTS: HGB 1800 10 (11.8), HGB @2000 10.2 (10)Passed TOV  , 2x bloody BM ON - Dr. Deluca aware of bloody BMs- recheck in AM. UOP inaccurate pt voiding w/stool.    STATUS POST: Sabas's reversal    POST OPERATIVE DAY #: 2    SUBJECTIVE: Patient seen and examined at bedside with chief. Reports no additional episodes of bloody BM, pain is well-controlled, denies n/v, cp, sob.      heparin   Injectable 5000 Unit(s) SubCutaneous every 8 hours      Vital Signs Last 24 Hrs  T(C): 36.7 (03 Dec 2022 20:30), Max: 36.9 (03 Dec 2022 14:00)  T(F): 98.1 (03 Dec 2022 20:30), Max: 98.5 (03 Dec 2022 14:00)  HR: 92 (03 Dec 2022 20:30) (71 - 92)  BP: 110/69 (03 Dec 2022 20:30) (109/65 - 123/78)  BP(mean): 87 (03 Dec 2022 14:00) (79 - 94)  RR: 17 (03 Dec 2022 20:30) (16 - 18)  SpO2: 99% (03 Dec 2022 20:30) (95% - 99%)    Parameters below as of 03 Dec 2022 20:30  Patient On (Oxygen Delivery Method): room air      I&O's Detail    02 Dec 2022 07:01  -  03 Dec 2022 07:00  --------------------------------------------------------  IN:    dextrose 5% + sodium chloride 0.45% w/ Additives: 1720 mL    Oral Fluid: 560 mL  Total IN: 2280 mL    OUT:    Stool (mL): 175 mL    Voided (mL): 700 mL  Total OUT: 875 mL    Total NET: 1405 mL      03 Dec 2022 07:01  -  03 Dec 2022 22:41  --------------------------------------------------------  IN:    dextrose 5% + sodium chloride 0.45% w/ Additives: 960 mL    IV PiggyBack: 250 mL  Total IN: 1210 mL    OUT:    Voided (mL): 1100 mL  Total OUT: 1100 mL    Total NET: 110 mL          General: NAD, resting comfortably in bed  C/V: NSR  Pulm: Nonlabored breathing, no respiratory distress  Abd: soft, NT/ND. Dressing c/d/i.  Extrem: WWP, no edema, SCDs in place        LABS:                        10.0   5.97  )-----------( 212      ( 03 Dec 2022 05:30 )             29.6     12-03    140  |  104  |  6<L>  ----------------------------<  115<H>  4.1   |  30  |  0.70    Ca    8.4      03 Dec 2022 05:30  Phos  2.2     12-03  Mg     2.4     12-03            RADIOLOGY & ADDITIONAL STUDIES:

## 2022-12-04 LAB
ANION GAP SERPL CALC-SCNC: 7 MMOL/L — SIGNIFICANT CHANGE UP (ref 5–17)
BUN SERPL-MCNC: 4 MG/DL — LOW (ref 7–23)
CALCIUM SERPL-MCNC: 8.7 MG/DL — SIGNIFICANT CHANGE UP (ref 8.4–10.5)
CHLORIDE SERPL-SCNC: 106 MMOL/L — SIGNIFICANT CHANGE UP (ref 96–108)
CO2 SERPL-SCNC: 27 MMOL/L — SIGNIFICANT CHANGE UP (ref 22–31)
CREAT SERPL-MCNC: 0.7 MG/DL — SIGNIFICANT CHANGE UP (ref 0.5–1.3)
EGFR: 118 ML/MIN/1.73M2 — SIGNIFICANT CHANGE UP
GLUCOSE SERPL-MCNC: 100 MG/DL — HIGH (ref 70–99)
HCT VFR BLD CALC: 27.7 % — LOW (ref 39–50)
HGB BLD-MCNC: 9.3 G/DL — LOW (ref 13–17)
MAGNESIUM SERPL-MCNC: 1.9 MG/DL — SIGNIFICANT CHANGE UP (ref 1.6–2.6)
MCHC RBC-ENTMCNC: 30.4 PG — SIGNIFICANT CHANGE UP (ref 27–34)
MCHC RBC-ENTMCNC: 33.6 GM/DL — SIGNIFICANT CHANGE UP (ref 32–36)
MCV RBC AUTO: 90.5 FL — SIGNIFICANT CHANGE UP (ref 80–100)
NRBC # BLD: 0 /100 WBCS — SIGNIFICANT CHANGE UP (ref 0–0)
PHOSPHATE SERPL-MCNC: 2.8 MG/DL — SIGNIFICANT CHANGE UP (ref 2.5–4.5)
PLATELET # BLD AUTO: 203 K/UL — SIGNIFICANT CHANGE UP (ref 150–400)
POTASSIUM SERPL-MCNC: 4.1 MMOL/L — SIGNIFICANT CHANGE UP (ref 3.5–5.3)
POTASSIUM SERPL-SCNC: 4.1 MMOL/L — SIGNIFICANT CHANGE UP (ref 3.5–5.3)
RBC # BLD: 3.06 M/UL — LOW (ref 4.2–5.8)
RBC # FLD: 12.4 % — SIGNIFICANT CHANGE UP (ref 10.3–14.5)
SODIUM SERPL-SCNC: 140 MMOL/L — SIGNIFICANT CHANGE UP (ref 135–145)
WBC # BLD: 5.45 K/UL — SIGNIFICANT CHANGE UP (ref 3.8–10.5)
WBC # FLD AUTO: 5.45 K/UL — SIGNIFICANT CHANGE UP (ref 3.8–10.5)

## 2022-12-04 RX ADMIN — Medication 1000 MILLIGRAM(S): at 22:25

## 2022-12-04 RX ADMIN — Medication 1000 MILLIGRAM(S): at 14:11

## 2022-12-04 RX ADMIN — Medication 1000 MILLIGRAM(S): at 13:11

## 2022-12-04 RX ADMIN — HEPARIN SODIUM 5000 UNIT(S): 5000 INJECTION INTRAVENOUS; SUBCUTANEOUS at 06:05

## 2022-12-04 RX ADMIN — Medication 1000 MILLIGRAM(S): at 06:04

## 2022-12-04 RX ADMIN — HEPARIN SODIUM 5000 UNIT(S): 5000 INJECTION INTRAVENOUS; SUBCUTANEOUS at 22:25

## 2022-12-04 RX ADMIN — HEPARIN SODIUM 5000 UNIT(S): 5000 INJECTION INTRAVENOUS; SUBCUTANEOUS at 13:12

## 2022-12-04 NOTE — PROGRESS NOTE ADULT - SUBJECTIVE AND OBJECTIVE BOX
GENERAL SURGERY PROGRESS NOTE    SUBJECTIVE: Patient seen and examined bedside.    heparin   Injectable 5000 Unit(s) SubCutaneous every 8 hours      Vital Signs Last 24 Hrs  T(C): 36.7 (04 Dec 2022 05:06), Max: 36.9 (03 Dec 2022 14:00)  T(F): 98.1 (04 Dec 2022 05:06), Max: 98.5 (03 Dec 2022 14:00)  HR: 87 (04 Dec 2022 05:06) (71 - 92)  BP: 122/78 (04 Dec 2022 05:06) (110/69 - 123/78)  BP(mean): 87 (03 Dec 2022 14:00) (87 - 87)  RR: 17 (04 Dec 2022 05:06) (16 - 17)  SpO2: 96% (04 Dec 2022 05:06) (96% - 100%)    Parameters below as of 04 Dec 2022 05:06  Patient On (Oxygen Delivery Method): room air      I&O's Detail    03 Dec 2022 07:01  -  04 Dec 2022 07:00  --------------------------------------------------------  IN:    dextrose 5% + sodium chloride 0.45% w/ Additives: 1440 mL    IV PiggyBack: 250 mL  Total IN: 1690 mL    OUT:    Voided (mL): 1800 mL  Total OUT: 1800 mL    Total NET: -110 mL          PHYSICAL EXAM    General: NAD, resting comfortably in bed  C/V: NSR  Pulm: Nonlabored breathing, no respiratory distress on room air  Abd: soft, ND, appropriate incisional tenderness, no rebound tenderness, no guarding  Extrem: WWP, no edema, SCDs in place        LABS:                        9.3    5.45  )-----------( 203      ( 04 Dec 2022 05:30 )             27.7     12-04    140  |  106  |  4<L>  ----------------------------<  100<H>  4.1   |  27  |  0.70    Ca    8.7      04 Dec 2022 05:30  Phos  2.8     12-04  Mg     1.9     12-04            RADIOLOGY & ADDITIONAL STUDIES:   GENERAL SURGERY PROGRESS NOTE    SUBJECTIVE: Patient seen and examined bedside with chief. Pt endorses tolerating diet, denied any nausea, vomiting cp, sob, dizziness leg pain. Pt states he is having flatus but no BM. Pain is controlled    heparin   Injectable 5000 Unit(s) SubCutaneous every 8 hours      Vital Signs Last 24 Hrs  T(C): 36.7 (04 Dec 2022 05:06), Max: 36.9 (03 Dec 2022 14:00)  T(F): 98.1 (04 Dec 2022 05:06), Max: 98.5 (03 Dec 2022 14:00)  HR: 87 (04 Dec 2022 05:06) (71 - 92)  BP: 122/78 (04 Dec 2022 05:06) (110/69 - 123/78)  BP(mean): 87 (03 Dec 2022 14:00) (87 - 87)  RR: 17 (04 Dec 2022 05:06) (16 - 17)  SpO2: 96% (04 Dec 2022 05:06) (96% - 100%)    Parameters below as of 04 Dec 2022 05:06  Patient On (Oxygen Delivery Method): room air      I&O's Detail    03 Dec 2022 07:01  -  04 Dec 2022 07:00  --------------------------------------------------------  IN:    dextrose 5% + sodium chloride 0.45% w/ Additives: 1440 mL    IV PiggyBack: 250 mL  Total IN: 1690 mL    OUT:    Voided (mL): 1800 mL  Total OUT: 1800 mL    Total NET: -110 mL          PHYSICAL EXAM    General: NAD, resting comfortably in bed  C/V: NSR  Pulm: Nonlabored breathing, no respiratory distress on room air  Abd: soft, ND, appropriate incisional tenderness, incisions cdi, no rebound tenderness, no guarding  Extrem: WWP, no edema, SCDs in place        LABS:                        9.3    5.45  )-----------( 203      ( 04 Dec 2022 05:30 )             27.7     12-04    140  |  106  |  4<L>  ----------------------------<  100<H>  4.1   |  27  |  0.70    Ca    8.7      04 Dec 2022 05:30  Phos  2.8     12-04  Mg     1.9     12-04            RADIOLOGY & ADDITIONAL STUDIES:

## 2022-12-05 VITALS
DIASTOLIC BLOOD PRESSURE: 77 MMHG | OXYGEN SATURATION: 97 % | TEMPERATURE: 98 F | RESPIRATION RATE: 18 BRPM | HEART RATE: 75 BPM | SYSTOLIC BLOOD PRESSURE: 123 MMHG

## 2022-12-05 LAB
ANION GAP SERPL CALC-SCNC: 9 MMOL/L — SIGNIFICANT CHANGE UP (ref 5–17)
BUN SERPL-MCNC: 8 MG/DL — SIGNIFICANT CHANGE UP (ref 7–23)
CALCIUM SERPL-MCNC: 9 MG/DL — SIGNIFICANT CHANGE UP (ref 8.4–10.5)
CHLORIDE SERPL-SCNC: 103 MMOL/L — SIGNIFICANT CHANGE UP (ref 96–108)
CO2 SERPL-SCNC: 28 MMOL/L — SIGNIFICANT CHANGE UP (ref 22–31)
CREAT SERPL-MCNC: 0.69 MG/DL — SIGNIFICANT CHANGE UP (ref 0.5–1.3)
EGFR: 118 ML/MIN/1.73M2 — SIGNIFICANT CHANGE UP
GLUCOSE SERPL-MCNC: 100 MG/DL — HIGH (ref 70–99)
HCT VFR BLD CALC: 29 % — LOW (ref 39–50)
HGB BLD-MCNC: 10 G/DL — LOW (ref 13–17)
MAGNESIUM SERPL-MCNC: 2 MG/DL — SIGNIFICANT CHANGE UP (ref 1.6–2.6)
MCHC RBC-ENTMCNC: 30.2 PG — SIGNIFICANT CHANGE UP (ref 27–34)
MCHC RBC-ENTMCNC: 34.5 GM/DL — SIGNIFICANT CHANGE UP (ref 32–36)
MCV RBC AUTO: 87.6 FL — SIGNIFICANT CHANGE UP (ref 80–100)
NRBC # BLD: 0 /100 WBCS — SIGNIFICANT CHANGE UP (ref 0–0)
PHOSPHATE SERPL-MCNC: 3.4 MG/DL — SIGNIFICANT CHANGE UP (ref 2.5–4.5)
PLATELET # BLD AUTO: 244 K/UL — SIGNIFICANT CHANGE UP (ref 150–400)
POTASSIUM SERPL-MCNC: 3.7 MMOL/L — SIGNIFICANT CHANGE UP (ref 3.5–5.3)
POTASSIUM SERPL-SCNC: 3.7 MMOL/L — SIGNIFICANT CHANGE UP (ref 3.5–5.3)
RBC # BLD: 3.31 M/UL — LOW (ref 4.2–5.8)
RBC # FLD: 12.5 % — SIGNIFICANT CHANGE UP (ref 10.3–14.5)
SODIUM SERPL-SCNC: 140 MMOL/L — SIGNIFICANT CHANGE UP (ref 135–145)
WBC # BLD: 5.07 K/UL — SIGNIFICANT CHANGE UP (ref 3.8–10.5)
WBC # FLD AUTO: 5.07 K/UL — SIGNIFICANT CHANGE UP (ref 3.8–10.5)

## 2022-12-05 PROCEDURE — 86901 BLOOD TYPING SEROLOGIC RH(D): CPT

## 2022-12-05 PROCEDURE — 85025 COMPLETE CBC W/AUTO DIFF WBC: CPT

## 2022-12-05 PROCEDURE — C1889: CPT

## 2022-12-05 PROCEDURE — 82962 GLUCOSE BLOOD TEST: CPT

## 2022-12-05 PROCEDURE — 36415 COLL VENOUS BLD VENIPUNCTURE: CPT

## 2022-12-05 PROCEDURE — 86900 BLOOD TYPING SEROLOGIC ABO: CPT

## 2022-12-05 PROCEDURE — 86850 RBC ANTIBODY SCREEN: CPT

## 2022-12-05 PROCEDURE — 83735 ASSAY OF MAGNESIUM: CPT

## 2022-12-05 PROCEDURE — 80048 BASIC METABOLIC PNL TOTAL CA: CPT

## 2022-12-05 PROCEDURE — 88307 TISSUE EXAM BY PATHOLOGIST: CPT

## 2022-12-05 PROCEDURE — 85027 COMPLETE CBC AUTOMATED: CPT

## 2022-12-05 PROCEDURE — 84100 ASSAY OF PHOSPHORUS: CPT

## 2022-12-05 PROCEDURE — 88304 TISSUE EXAM BY PATHOLOGIST: CPT

## 2022-12-05 RX ORDER — ACETAMINOPHEN 500 MG
2 TABLET ORAL
Qty: 0 | Refills: 0 | DISCHARGE
Start: 2022-12-05

## 2022-12-05 RX ORDER — POTASSIUM CHLORIDE 20 MEQ
20 PACKET (EA) ORAL ONCE
Refills: 0 | Status: COMPLETED | OUTPATIENT
Start: 2022-12-05 | End: 2022-12-05

## 2022-12-05 RX ORDER — OXYCODONE HYDROCHLORIDE 5 MG/1
1 TABLET ORAL
Qty: 5 | Refills: 0
Start: 2022-12-05

## 2022-12-05 RX ADMIN — Medication 20 MILLIEQUIVALENT(S): at 11:14

## 2022-12-05 RX ADMIN — HEPARIN SODIUM 5000 UNIT(S): 5000 INJECTION INTRAVENOUS; SUBCUTANEOUS at 06:33

## 2022-12-05 RX ADMIN — Medication 1000 MILLIGRAM(S): at 16:01

## 2022-12-05 RX ADMIN — HEPARIN SODIUM 5000 UNIT(S): 5000 INJECTION INTRAVENOUS; SUBCUTANEOUS at 14:17

## 2022-12-05 RX ADMIN — Medication 1000 MILLIGRAM(S): at 09:22

## 2022-12-05 NOTE — PROGRESS NOTE ADULT - ASSESSMENT
42M PMHx recurrent diverticulitis, recently admitted for perforated diverticulitis 8/4-15 s/p Sabas's procedure (8/4) c/b prolonged postoperative ileus, now s/p Sabas's reversal.    ERAS  CLD/IVF@40 adv w flatus  Pain/Nausea control PRN  SCD/SQH/OOB  dc De [place] today  AM Labs  
42M PMHx recurrent diverticulitis, recently admitted for perforated diverticulitis 8/4-15 s/p Sabas's procedure (8/4) c/b prolonged postoperative ileus, now s/p Sabas's reversal. Multiple episodes of dark red blood per rectum.      LRD  Pain/Nausea control PRN  HSQ/SCD/SQH/OOB  AM Labs      
A/P: 42M PMHx recurrent diverticulitis, recently admitted for perforated diverticulitis 8/4-15 s/p Sabas's procedure (8/4) c/b prolonged postoperative ileus, now s/p Sabas's reversal.  - Diet: CLD/IVF@40  - Activity: as tolerated  - Labs: AM  - Pain medication PRN  - DVT ppx: SQH     Will continue to monitor and continue ERAS protocol.  DEREK blakely in AM  Change IVF to D51/2NS @40 in AM  Monitor BF and advance to LRD once return of BF
42M POD2 Sabas's reversal.     CLD/IVF  Pain/Nausea control PRN  HSQ/SCD/SQH/OOB  AM Labs  
42M POD2 Sabas's reversal.     Advance to LRD  Dc fluids  Pain/Nausea control PRN  HSQ/SCD/SQH/OOB  AM Labs  
s/p reversal of Sabas's  to stop sq heparin due to bleed  follow progress

## 2022-12-05 NOTE — DISCHARGE NOTE NURSING/CASE MANAGEMENT/SOCIAL WORK - NSDCPEFALRISK_GEN_ALL_CORE
For information on Fall & Injury Prevention, visit: [email or web address] OR  [email or web address] OR  https://www.cdc.gov/steadi/patient.html

## 2022-12-05 NOTE — PROGRESS NOTE ADULT - SUBJECTIVE AND OBJECTIVE BOX
STATUS POST:  Reversal, Sabas procedure, laparoscopic    Flexible sigmoidoscopy        POST OPERATIVE DAY #: 4    SUBJECTIVE:   Patient seen and examined on am rounds. No new complaints. +F+BM, no further blood in stool. -n-v-cp-sob.    Vital Signs Last 24 Hrs  T(C): 36.6 (05 Dec 2022 05:00), Max: 36.8 (04 Dec 2022 17:09)  T(F): 97.9 (05 Dec 2022 05:00), Max: 98.3 (04 Dec 2022 17:09)  HR: 75 (05 Dec 2022 05:00) (73 - 89)  BP: 116/71 (05 Dec 2022 05:00) (116/71 - 143/59)  BP(mean): --  RR: 18 (05 Dec 2022 05:00) (16 - 18)  SpO2: 96% (05 Dec 2022 05:00) (96% - 98%)    Parameters below as of 05 Dec 2022 05:00  Patient On (Oxygen Delivery Method): room air        I&O's Summary    04 Dec 2022 07:01  -  05 Dec 2022 07:00  --------------------------------------------------------  IN: 920 mL / OUT: 650 mL / NET: 270 mL        Physical Exam:  General Appearance: Appears well, NAD  Pulmonary: Nonlabored breathing, no respiratory distress  Cardiovascular: NSR  Abdomen: Soft, nondisteded, nontender. Packing changed. incisions cdi.  Extremities: WWP, SCD's in place     LABS:                        9.3    5.45  )-----------( 203      ( 04 Dec 2022 05:30 )             27.7     12-04    140  |  106  |  4<L>  ----------------------------<  100<H>  4.1   |  27  |  0.70    Ca    8.7      04 Dec 2022 05:30  Phos  2.8     12-04  Mg     1.9     12-04

## 2022-12-05 NOTE — DISCHARGE NOTE PROVIDER - NSDCMRMEDTOKEN_GEN_ALL_CORE_FT
acetaminophen 500 mg oral tablet: 2 tab(s) orally every 6 hours  oxyCODONE 5 mg oral tablet: 1 tab(s) orally every 6 hours, As Needed -for severe pain MDD:4

## 2022-12-05 NOTE — DISCHARGE NOTE NURSING/CASE MANAGEMENT/SOCIAL WORK - PATIENT PORTAL LINK FT
You can access the FollowMyHealth Patient Portal offered by [facility] by registering at the following website: [email or web address]. By joining [facility]’s FollowMyHealth portal, you will also be able to view your health information using other applications (apps) compatible with our system.

## 2022-12-05 NOTE — DISCHARGE NOTE PROVIDER - CARE PROVIDER_API CALL
Vitor Deluca)  Surgery  [address] [place] [address], [address]  [place] [address]  Phone: [phone]  Fax: [phone]  Follow Up Time: 1-3 days

## 2022-12-05 NOTE — DISCHARGE NOTE PROVIDER - NSDCFUADDINST_GEN_ALL_CORE_FT
Please see Dr. Deluca in the office tomorrow (12/6) for packing change.    General Discharge Instructions:  Please resume all regular home medications unless specifically advised not to take a particular medication. Also, please take any new medications as prescribed.  Please get plenty of rest, continue to ambulate several times per day, and drink adequate amounts of fluids. Avoid lifting weights greater than 5-10 lbs until you follow-up with your surgeon, who will instruct you further regarding activity restrictions.  Avoid driving or operating heavy machinery while taking pain medications.  Please follow-up with your surgeon and Primary Care Provider (PCP) as advised.  *Please call your doctor if you have increased pain.    Warning Signs:  Please call your doctor if you experience the following:  *You experience new chest pain, pressure, squeezing or tightness.  *New or worsening cough, shortness of breath, or wheeze.  *If you are vomiting and cannot keep down fluids or your medications.  *You are getting dehydrated due to continued vomiting, diarrhea, or other reasons. Signs of dehydration include dry mouth, rapid heartbeat, or feeling dizzy or faint when standing.  *You see blood or dark/black material when you vomit or have a bowel movement.  *You experience burning when you urinate, have blood in your urine, or experience a discharge.  *Your pain is not improving within 8-12 hours or is not gone within 24 hours. Call or return immediately if your pain is getting worse, changes location, or moves to your chest or back.  *You have shaking chills, or fever greater than 101.5 degrees Fahrenheit or 38 degrees Celsius.  *Any change in your symptoms, or any new symptoms that concerns you.

## 2022-12-05 NOTE — DISCHARGE NOTE PROVIDER - HOSPITAL COURSE
42M PMHx recurrent diverticulitis, recently admitted for perforated diverticulitis 8/4-15 s/p Sabas's procedure (8/4) c/b prolonged postoperative ileus, who now presents for elective Sabas's reversal. Pt was well at time of operation 12/1. Postop his diet was advanced and tolerated. The patient had his blakely removed and passed trial of void. The patient had multiple episodes of dark blood per rectum, but remained hemodynamically stable with stable hemoglobin. This resolved by discharge. At time of discharge, patient tolerating low residue diet, having flatus and bm, pain controlled, ambulating. The patient was medically cleared for discharge.

## 2022-12-07 LAB — SURGICAL PATHOLOGY STUDY: SIGNIFICANT CHANGE UP

## 2022-12-09 DIAGNOSIS — Z87.19 PERSONAL HISTORY OF OTHER DISEASES OF THE DIGESTIVE SYSTEM: ICD-10-CM

## 2022-12-09 DIAGNOSIS — Z90.49 ACQUIRED ABSENCE OF OTHER SPECIFIED PARTS OF DIGESTIVE TRACT: ICD-10-CM

## 2022-12-09 DIAGNOSIS — Z43.3 ENCOUNTER FOR ATTENTION TO COLOSTOMY: ICD-10-CM

## 2022-12-09 DIAGNOSIS — K66.0 PERITONEAL ADHESIONS (POSTPROCEDURAL) (POSTINFECTION): ICD-10-CM

## 2023-10-30 NOTE — ED ADULT NURSE NOTE - WILL THE PATIENT ACCEPT THE PFIZER COVID-19 VACCINE IF ELIGIBLE AND IT IS AVAILABLE?
Not applicable Rinvoq Counseling: I discussed with the patient the risks of Rinvoq therapy including but not limited to upper respiratory tract infections, shingles, cold sores, bronchitis, nausea, cough, fever, acne, and headache. Live vaccines should be avoided.  This medication has been linked to serious infections; higher rate of mortality; malignancy and lymphoproliferative disorders; major adverse cardiovascular events; thrombosis; thrombocytopenia, anemia, and neutropenia; lipid elevations; liver enzyme elevations; and gastrointestinal perforations.

## 2024-09-20 ENCOUNTER — TRANSCRIPTION ENCOUNTER (OUTPATIENT)
Age: 44
End: 2024-09-20

## 2024-09-26 ENCOUNTER — APPOINTMENT (OUTPATIENT)
Dept: UROLOGY | Facility: CLINIC | Age: 44
End: 2024-09-26
Payer: MEDICAID

## 2024-09-26 ENCOUNTER — NON-APPOINTMENT (OUTPATIENT)
Age: 44
End: 2024-09-26

## 2024-09-26 VITALS
BODY MASS INDEX: 25.84 KG/M2 | TEMPERATURE: 97.9 F | DIASTOLIC BLOOD PRESSURE: 90 MMHG | HEART RATE: 87 BPM | WEIGHT: 195 LBS | HEIGHT: 73 IN | SYSTOLIC BLOOD PRESSURE: 127 MMHG

## 2024-09-26 DIAGNOSIS — I86.1 SCROTAL VARICES: ICD-10-CM

## 2024-09-26 PROBLEM — Z00.00 ENCOUNTER FOR PREVENTIVE HEALTH EXAMINATION: Status: ACTIVE | Noted: 2024-09-26

## 2024-09-26 PROCEDURE — G2211 COMPLEX E/M VISIT ADD ON: CPT | Mod: NC

## 2024-09-26 PROCEDURE — 99204 OFFICE O/P NEW MOD 45 MIN: CPT

## 2024-09-26 NOTE — HISTORY OF PRESENT ILLNESS
[FreeTextEntry1] : 44M presents as new visit for right sided varicocele  Referred by: Dr. Deluca  Pt states he was working out with a  and then developed right groin pain. He went to ED to make sure it wasn't a hernia, had a scrotal US which showed a "right sided varicocele" He states it only hurts when he does sit ups. He denies any left sided groin pain.  He states in general he does not have any groin, abdominal pain, or flank pain.  Has a partner (36F Andie), not currently trying to have children, but considering in the future. Partner lives in [place].  No children in the past. Pt had a pregnancy with ex-wife.   BPH: Denies any bothersome LUTS. Nocturia 1x. Denies dysuria or hematuria.   ED: Denies any issues with ED. He reports hematospermia X2 (~6 months ago).   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

## 2024-09-26 NOTE — LETTER BODY
[Dear  ___] : Dear  [unfilled], [FreeTextEntry2] : Vitor Deluca MD [address] [place] [address], [address] [place], [place] [id number] [FreeTextEntry1] : I had the pleasure of seeing your patient,  JORY NIEVES, a 44 year old man, in the office in consultation today. Please see the attached note for full details.  Thank you very much for allowing me to participate in the care of this patient. If you have any questions please feel free to call me at any time.    Sincerely yours,    Ortega Herrmann MD, BUD Director, Male Fertility and Microsurgery  of Urology [facility]

## 2024-09-26 NOTE — PHYSICAL EXAM
[General Appearance - Well Developed] : well developed [General Appearance - Well Nourished] : well nourished [] : no respiratory distress [Oriented To Time, Place, And Person] : oriented to person, place, and time [de-identified] : bilateral vas palpable, grade III varicocele right, grade 1 varicocele left, 16 cc testicles b/l

## 2024-09-26 NOTE — ASSESSMENT
[FreeTextEntry1] : R Groin pain in setting of, Right grade III varicocele, left grade I varicocele hematospermia  UA/UCx sitz bath, NSAIDs for pain control CT abdomen/pelvis to r/o abdominal etiology  Discussed SA - pt would like to defer for now  discussed role varicocele may play in fertility  PSA

## 2024-09-26 NOTE — PHYSICAL EXAM
[General Appearance - Well Developed] : well developed [General Appearance - Well Nourished] : well nourished [] : no respiratory distress [Oriented To Time, Place, And Person] : oriented to person, place, and time [de-identified] : bilateral vas palpable, grade III varicocele right, grade 1 varicocele left, 16 cc testicles b/l

## 2024-09-26 NOTE — LETTER BODY
[Dear  ___] : Dear  [unfilled], [FreeTextEntry2] : Vitor Deluca MD [address] [place] [address], [address] [place], [place] [id number] [FreeTextEntry1] : I had the pleasure of seeing your patient,  JORY NIEVES, a 44 year old man, in the office in consultation today. Please see the attached note for full details.  Thank you very much for allowing me to participate in the care of this patient. If you have any questions please feel free to call me at any time.    Sincerely yours,    Ortega Herrmann MD, BUD Director, Male Fertility and Microsurgery  of Urology [facility]

## 2024-09-26 NOTE — HISTORY OF PRESENT ILLNESS
[FreeTextEntry1] : 44M presents as new visit for right sided varicocele  Referred by: Dr. Deluca  Pt states he was working out with a  and then developed right groin pain. He went to ED to make sure it wasn't a hernia, had a scrotal US which showed a "right sided varicocele" He states it only hurts when he does sit ups. He denies any left sided groin pain.  He states in general he does not have any groin, abdominal pain, or flank pain.  Has a partner (36F Andie), not currently trying to have children, but considering in the future. Partner lives in [place].  No children in the past. Pt had a pregnancy with ex-wife.   BPH: Denies any bothersome LUTS. Nocturia 1x. Denies dysuria or hematuria.   ED: Denies any issues with ED. He reports hematospermia X2 (~6 months ago).   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

## 2024-09-27 LAB
APPEARANCE: CLEAR
BILIRUBIN URINE: NEGATIVE
BLOOD URINE: NEGATIVE
COLOR: YELLOW
GLUCOSE QUALITATIVE U: NEGATIVE MG/DL
KETONES URINE: NEGATIVE MG/DL
LEUKOCYTE ESTERASE URINE: NEGATIVE
NITRITE URINE: NEGATIVE
PH URINE: 6
PROTEIN URINE: NEGATIVE MG/DL
SPECIFIC GRAVITY URINE: 1.02
UROBILINOGEN URINE: 0.2 MG/DL

## 2024-09-30 LAB — BACTERIA UR CULT: NORMAL

## 2024-10-04 ENCOUNTER — TRANSCRIPTION ENCOUNTER (OUTPATIENT)
Age: 44
End: 2024-10-04

## 2024-10-04 ENCOUNTER — OUTPATIENT (OUTPATIENT)
Dept: OUTPATIENT SERVICES | Facility: HOSPITAL | Age: 44
LOS: 1 days | End: 2024-10-04
Payer: MEDICAID

## 2024-10-04 ENCOUNTER — APPOINTMENT (OUTPATIENT)
Dept: CT IMAGING | Facility: HOSPITAL | Age: 44
End: 2024-10-04

## 2024-10-04 DIAGNOSIS — Z98.890 OTHER SPECIFIED POSTPROCEDURAL STATES: Chronic | ICD-10-CM

## 2024-10-04 DIAGNOSIS — Z90.49 ACQUIRED ABSENCE OF OTHER SPECIFIED PARTS OF DIGESTIVE TRACT: Chronic | ICD-10-CM

## 2024-10-04 PROCEDURE — 74176 CT ABD & PELVIS W/O CONTRAST: CPT

## 2024-10-04 PROCEDURE — 74176 CT ABD & PELVIS W/O CONTRAST: CPT | Mod: 26

## 2024-10-07 ENCOUNTER — NON-APPOINTMENT (OUTPATIENT)
Age: 44
End: 2024-10-07

## 2025-02-21 ENCOUNTER — NON-APPOINTMENT (OUTPATIENT)
Age: 45
End: 2025-02-21

## 2025-02-21 ENCOUNTER — APPOINTMENT (OUTPATIENT)
Dept: UROLOGY | Facility: CLINIC | Age: 45
End: 2025-02-21
Payer: MEDICAID

## 2025-02-21 VITALS
TEMPERATURE: 97.8 F | WEIGHT: 195 LBS | DIASTOLIC BLOOD PRESSURE: 79 MMHG | BODY MASS INDEX: 25.84 KG/M2 | HEART RATE: 86 BPM | SYSTOLIC BLOOD PRESSURE: 120 MMHG | HEIGHT: 73 IN

## 2025-02-21 DIAGNOSIS — R36.1 HEMATOSPERMIA: ICD-10-CM

## 2025-02-21 PROCEDURE — G2211 COMPLEX E/M VISIT ADD ON: CPT | Mod: NC

## 2025-02-21 PROCEDURE — 99213 OFFICE O/P EST LOW 20 MIN: CPT

## 2025-02-24 LAB
APPEARANCE: CLEAR
BACTERIA UR CULT: NORMAL
BACTERIA: NEGATIVE /HPF
BILIRUBIN URINE: NEGATIVE
BLOOD URINE: NEGATIVE
CAST: 0 /LPF
COLOR: YELLOW
EPITHELIAL CELLS: 0 /HPF
GLUCOSE QUALITATIVE U: NEGATIVE MG/DL
KETONES URINE: ABNORMAL MG/DL
LEUKOCYTE ESTERASE URINE: NEGATIVE
MICROSCOPIC-UA: NORMAL
NITRITE URINE: NEGATIVE
PH URINE: 6
PROTEIN URINE: NEGATIVE MG/DL
PSA SERPL-MCNC: 0.19 NG/ML
RED BLOOD CELLS URINE: 1 /HPF
SPECIFIC GRAVITY URINE: >1.03
UROBILINOGEN URINE: 1 MG/DL
WHITE BLOOD CELLS URINE: 1 /HPF

## 2025-06-13 NOTE — PROGRESS NOTE ADULT - ASSESSMENT
Medically cleared   42 M PMH of uncomplicated diverticulitis x3 (last episode 6 months ago) presents with acute complicated diverticulitis with focal perforation, on IV antibiotics now s/p Hartmans procedure on 8/4.    - CLD  - Abx: zosyn  - Pain and Nausea control  - SCD/SQH  - ostomy consult  - IS/OOB  - AM labs

## (undated) DEVICE — POSITIONER FOAM EGG CRATE ULNAR 2PCS (PINK)

## (undated) DEVICE — VENODYNE/SCD SLEEVE CALF MEDIUM

## (undated) DEVICE — SOL IRR BAG NS 0.9% 3000ML

## (undated) DEVICE — STAPLER COVIDIEN ENDO GIA STANDARD HANDLE

## (undated) DEVICE — SUT PDS II 1 48" TP-1

## (undated) DEVICE — FOLEY TRAY 16FR LF URINE METER SURESTEP

## (undated) DEVICE — LIGASURE IMPACT

## (undated) DEVICE — LIGASURE MARYLAND 37CM

## (undated) DEVICE — Device

## (undated) DEVICE — TUBING STRYKER PNEUMOSURE HI FLOW INSUFFLATOR

## (undated) DEVICE — MARKING PEN W RULER

## (undated) DEVICE — SUT VICRYL 0 27" UR-6

## (undated) DEVICE — DRSG DERMABOND 0.7ML

## (undated) DEVICE — SUT MONOCRYL 4-0 18" PS-2

## (undated) DEVICE — GLV 8 PROTEXIS (WHITE)

## (undated) DEVICE — KIT SIGMOIDOSCOPE NO SWAB SGL USE

## (undated) DEVICE — SUT PDS II 0 27" CT-1

## (undated) DEVICE — TROCAR COVIDIEN VERSAPORT BLADELESS OPTICAL 12MM STANDARD

## (undated) DEVICE — ENDOCATCH 10MM SPECIMEN POUCH

## (undated) DEVICE — SUT VICRYL 2-0 27" SH

## (undated) DEVICE — FOLEY TRAY 16FR 5CC LF UMETER CLOSED

## (undated) DEVICE — PACK GENERAL LAPAROSCOPY

## (undated) DEVICE — D HELP - CLEARVIEW CLEARIFY SYSTEM

## (undated) DEVICE — TIP METZENBAUM SCISSOR MONOPOLAR ENDOCUT (ORANGE)

## (undated) DEVICE — WARMING BLANKET UPPER ADULT

## (undated) DEVICE — TROCAR ETHICON ENDOPATH XCEL UNIVERSAL SLEEVE WITH OPTIVIEW 5MM X 100MM

## (undated) DEVICE — TROCAR ETHICON ENDOPATH XCEL BLADELESS 5MM X 100MM STABILITY

## (undated) DEVICE — SUT VICRYL 3-0 27" SH

## (undated) DEVICE — TROCAR COVIDIEN VERSAONE FIXATION CANNULA 12MM

## (undated) DEVICE — SOL ANTI FOG

## (undated) DEVICE — TROCAR COVIDIEN BLUNT TIP HASSAN 10MM

## (undated) DEVICE — PACK EXPLORATORY LAPAROTOMY

## (undated) DEVICE — GOWN XL

## (undated) DEVICE — SUT VICRYL 2-0 18" TIES

## (undated) DEVICE — SUT VICRYL 0 36" CT-1